# Patient Record
Sex: FEMALE | Race: WHITE | NOT HISPANIC OR LATINO
[De-identification: names, ages, dates, MRNs, and addresses within clinical notes are randomized per-mention and may not be internally consistent; named-entity substitution may affect disease eponyms.]

---

## 2016-10-29 RX ORDER — LEVOTHYROXINE SODIUM 125 MCG
1 TABLET ORAL
Qty: 0 | Refills: 0 | DISCHARGE
Start: 2016-10-29 | End: 2016-11-28

## 2016-10-29 RX ORDER — FERROUS SULFATE 325(65) MG
1 TABLET ORAL
Qty: 0 | Refills: 0 | DISCHARGE
Start: 2016-10-29 | End: 2016-11-08

## 2017-09-20 PROBLEM — Z51.89 VISIT FOR WOUND CHECK: Status: ACTIVE | Noted: 2017-09-20

## 2017-09-22 PROBLEM — I50.32 CHRONIC DIASTOLIC CONGESTIVE HEART FAILURE: Status: ACTIVE | Noted: 2017-09-22

## 2017-09-25 ENCOUNTER — APPOINTMENT (OUTPATIENT)
Dept: CARDIOTHORACIC SURGERY | Facility: CLINIC | Age: 77
End: 2017-09-25
Payer: MEDICARE

## 2017-09-25 VITALS
HEART RATE: 76 BPM | TEMPERATURE: 97.9 F | OXYGEN SATURATION: 92 % | DIASTOLIC BLOOD PRESSURE: 61 MMHG | SYSTOLIC BLOOD PRESSURE: 124 MMHG

## 2017-09-25 DIAGNOSIS — Z51.89 ENCOUNTER FOR OTHER SPECIFIED AFTERCARE: ICD-10-CM

## 2017-09-25 DIAGNOSIS — Z09 ENCOUNTER FOR FOLLOW-UP EXAMINATION AFTER COMPLETED TREATMENT FOR CONDITIONS OTHER THAN MALIGNANT NEOPLASM: ICD-10-CM

## 2017-09-25 DIAGNOSIS — Z95.2 PRESENCE OF PROSTHETIC HEART VALVE: ICD-10-CM

## 2017-09-25 DIAGNOSIS — I50.32 CHRONIC DIASTOLIC (CONGESTIVE) HEART FAILURE: ICD-10-CM

## 2017-09-25 PROCEDURE — 99213 OFFICE O/P EST LOW 20 MIN: CPT | Mod: NC

## 2022-03-11 ENCOUNTER — NON-APPOINTMENT (OUTPATIENT)
Age: 82
End: 2022-03-11

## 2022-03-11 ENCOUNTER — APPOINTMENT (OUTPATIENT)
Dept: NEUROSURGERY | Facility: CLINIC | Age: 82
End: 2022-03-11
Payer: MEDICARE

## 2022-03-11 VITALS
DIASTOLIC BLOOD PRESSURE: 84 MMHG | HEIGHT: 63 IN | BODY MASS INDEX: 30.12 KG/M2 | WEIGHT: 170 LBS | RESPIRATION RATE: 95 BRPM | TEMPERATURE: 98.2 F | HEART RATE: 72 BPM | SYSTOLIC BLOOD PRESSURE: 152 MMHG

## 2022-03-11 DIAGNOSIS — Z01.812 ENCOUNTER FOR PREPROCEDURAL LABORATORY EXAMINATION: ICD-10-CM

## 2022-03-11 DIAGNOSIS — I65.23 OCCLUSION AND STENOSIS OF BILATERAL CAROTID ARTERIES: ICD-10-CM

## 2022-03-11 PROCEDURE — 99205 OFFICE O/P NEW HI 60 MIN: CPT

## 2022-03-15 NOTE — DATA REVIEWED
[de-identified] : CT 2/25/22: no infarct, hemorrhage or edema [de-identified] : Carotid dopplers 2/25/22: Mild to moderate bilateral atheromatous change

## 2022-03-15 NOTE — ASSESSMENT
[FreeTextEntry1] : I have discussed the natural history and treatment options for moderate carotid stenosis with the patient. I explained the indications for observation and imaging surveillance, medical management, and surgery. I discussed the risks, benefits, possible complications and expected outcome related to each treatment option.  In the end, I recommend an MRI brain to rule out silent infarct and CTA head and neck to further evaluate the severity of the carotid stenosis. The patient should continue a baby ASA daily and should continue daily statin therapy with aim for goal LDL <70 and HbA1c <6.0 for stroke prevention. The patient should return to the office for follow up in 2 weeks upon completion of the imaging for review and treatment planning.  The patient reports that she will be out of town from 3/12/22-4/2/22 and will not be able to get her imaging until after 4/2.  The patient understands the plan of care and is in agreement.  All questions answered to patient satisfaction.\par

## 2022-03-15 NOTE — PHYSICAL EXAM
[General Appearance - Alert] : alert [General Appearance - In No Acute Distress] : in no acute distress [Oriented To Time, Place, And Person] : oriented to person, place, and time [Affect] : the affect was normal [Impaired Insight] : insight and judgment were intact [Person] : oriented to person [Place] : oriented to place [Time] : oriented to time [Short Term Intact] : short term memory intact [Remote Intact] : remote memory intact [Span Intact] : the attention span was normal [Concentration Intact] : normal concentrating ability [Fluency] : fluency intact [Comprehension] : comprehension intact [Current Events] : adequate knowledge of current events [Past History] : adequate knowledge of personal past history [Vocabulary] : adequate range of vocabulary [Cranial Nerves Optic (II)] : visual acuity intact bilaterally,  pupils equal round and reactive to light [Cranial Nerves Oculomotor (III)] : extraocular motion intact [Cranial Nerves Trigeminal (V)] : facial sensation intact symmetrically [Cranial Nerves Facial (VII)] : face symmetrical [Cranial Nerves Vestibulocochlear (VIII)] : hearing was intact bilaterally [Cranial Nerves Glossopharyngeal (IX)] : tongue and palate midline [Cranial Nerves Accessory (XI - Cranial And Spinal)] : head turning and shoulder shrug symmetric [Cranial Nerves Hypoglossal (XII)] : there was no tongue deviation with protrusion [Motor Tone] : muscle tone was normal in all four extremities [Motor Strength] : muscle strength was normal in all four extremities [No Muscle Atrophy] : normal bulk in all four extremities [Sensation Tactile Decrease] : light touch was intact [Abnormal Walk] : normal gait [Balance] : balance was intact [2+] : Patella left 2+ [Past-pointing] : there was no past-pointing [Tremor] : no tremor present [FreeTextEntry5] : few beats of nystagmus on left lateral gaze

## 2022-03-15 NOTE — HISTORY OF PRESENT ILLNESS
[de-identified] : RONNIE CHAVARRIA is a 82 year female with a PMH of HTN, DM, HLD, anemia, Anxiety, transient Afib after heart surgery not currently on AC, Valvular disease, non-smoker, s/p AVR 2016, Hypothyroid s/p thyroid ca w/ resection, who presents to the office today for neuroendovascular consultation as a referral from Dr. Álvarez due to mild to moderate bilateral carotid stenosis on recent Carotid U/S done 2/25/22.  The patient reports dizziness that has been occurring on a daily basis since about 4 days after a fall she sustained in February in which she hit her head, no LOC.  CTH at the time was negative for intracranial pathology.  She states that her dizziness is characterized as a lightheaded feeling that starts shortly after she gets out of bed for the day and lasts most of the day.  She has not fallen.   She denies headache, weakness, numbness, speech or visual difficulty, gait imbalance.  She has never had a stroke.  She is currently on ASA and statin therapy.  She is here to rule out possible cerebrovascular etiology for her dizziness.\par No allergies\par Never a smoker. \par \par

## 2022-04-14 LAB
ANION GAP SERPL CALC-SCNC: 13 MMOL/L
BUN SERPL-MCNC: 13 MG/DL
CALCIUM SERPL-MCNC: 9.4 MG/DL
CHLORIDE SERPL-SCNC: 99 MMOL/L
CO2 SERPL-SCNC: 27 MMOL/L
CREAT SERPL-MCNC: 0.94 MG/DL
EGFR: 61 ML/MIN/1.73M2
GLUCOSE SERPL-MCNC: 90 MG/DL
POTASSIUM SERPL-SCNC: 4.4 MMOL/L
SODIUM SERPL-SCNC: 139 MMOL/L

## 2022-04-15 ENCOUNTER — RESULT REVIEW (OUTPATIENT)
Age: 82
End: 2022-04-15

## 2022-04-29 ENCOUNTER — APPOINTMENT (OUTPATIENT)
Dept: NEUROSURGERY | Facility: CLINIC | Age: 82
End: 2022-04-29
Payer: MEDICARE

## 2022-04-29 PROCEDURE — 99443: CPT

## 2022-04-30 NOTE — PHYSICAL EXAM
[General Appearance - Alert] : alert [] : normal voice and communication [Person] : oriented to person [Place] : oriented to place [Time] : oriented to time [Short Term Intact] : short term memory intact [Remote Intact] : remote memory intact [Registration Intact] : recent registration memory intact [Fluency] : fluency intact [Current Events] : adequate knowledge of current events

## 2022-05-04 NOTE — DATA REVIEWED
[de-identified] : \par  [facility] CT Report             Final\par \par No Documents Attached\par \par \par \par \par   [facility]\par                                          [address]\par                                    [place], [place]  [address]\par                                        Department of Radiology\par                                             [phone]\par \par \par Patient Name:      RONNIE CHAVARRIA                Location:       [facility]\par Med Rec #:        [id number]                    Account #:      [id number]\par YOB: 1940                    Ordering:       Susan Emerson\par Age: 82               Sex:    F                 Attending:      Susan Emerson\par PCP:        Vikas Álvarez MD\par ______________________________________________________________________________________\par \par Exam Date:      04/15/22\par Exam:         CTA HEAD (W)AW IC; CTA NECK (W)AW IC\par \par Order#:       [id number]; [id number]\par \par \par \par PROCEDURE: CTA head with and without intravenous contrast. CTA neck with and without intravenous\par contrast.\par  INDICATION: Nonspecific neurological symptoms. Bilateral carotid artery stenosis\par \par  TECHNIQUE: CT angiography of the head and neck was performed with and without administration of\par intravenous contrast.\par \par  Multiple thin section axial images were obtained of the head through the [place] prior to\par and following the intravenous injection of contrast. Multiple 3-D reformatted images were generated\par from the axial cuts. Postprocessing was performed on an independent workstation.\par \par  Multiple axial thin sections were obtained through the neck prior to and following the intravenous\par injection of contrast. Multiple 3-D reformatted images were generated from the axial cuts.\par Postprocessing was performed on an independent workstation.\par \par \par  100 mL of of Isovue 370 were administered intravenously.\par \par  COMPARISON: None available\par \par  FINDINGS:\par \par  CTA Head:\par \par  Please see concurrent separately dictated noncontrast MRI examination for discussion of\par intracranial findings.\par \par  Atherosclerotic calcifications of the supraclinoid and cavernous segments of bilateral internal\par carotid arteries and V4 segments of bilateral vertebral arteries contributing to mild stenosis.\par \par  Intracranial segments of bilateral internal carotid arteries, bilateral proximal middle cerebral\par arteries, proximal bilateral anterior cerebral arteries and ophthalmic arteries are patent without\par hemodynamically significant stenosis or dissection.\par \par  Superior and inferior divisions and proximal M2 segments the bilateral middle cerebral arteries are\par patent.\par \par  Anterior communicating artery is unremarkable. Bilateral posterior communicating arteries are\par present.\par \par  Basilar artery, proximal bilateral posterior cerebral arteries, proximal bilateral superior\par cerebellar arteries, proximal bilateral posterior inferior cerebellar arteries, V4 segments of the\par bilateral vertebral arteries, and bilateral proximal anterior cerebellar arteries are patent without\par hemodynamically significant stenosis or dissection.\par \par  No saccular intracranial aneurysm identified. No evidence for cerebral arteriovenous malformation.\par Dural venous sinuses appear to be patent.\par \par \par  CTA Neck:\par \par  Normal 3 vessel branch pattern present at aortic arch.\par \par  Scattered atherosclerotic calcifications of the thoracic aorta and proximal great vessels.\par \par  Origins of great vessels are patent. Origins of vertebral arteries are patent.\par \par  There is calcified/noncalcified atherosclerotic plaque of the bilateral carotid bifurcations and\par bilateral proximal internal carotid arteries, contributing to less than 50% stenosis as per NASCET\par criteria.\par \par  Bilateral common carotid, bilateral common carotid, cervical segments of bilateral internal\par carotid, V1 through V3 segments of bilateral vertebral and bilateral carotid bifurcations are patent\par without hemodynamically significant stenosis or dissection.\par  There is dental amalgam with streak artifact, which obscures evaluation of the oral cavity within\par the aerodigestive tract.\par \par  No enlarged cervical lymph nodes by CT size criteria are identified. No cervical lymph nodes with\par areas of internal necrosis or calcification are identified.\par \par  Parotid, submandibular and thyroid glands are within normal limits. Visualized lung apices are\par unremarkable.\par \par  There are multilevel degenerative changes of cervical spine with posterior osteophytic ridging with\par disc bulging and bilateral uncovertebral/facet hypertrophy most prominently at the C4-C5 and C5-C6\par level where there is mild to moderate canal stenosis and mild to moderate bilateral foraminal\par stenosis.\par \par  Partially imaged median sternotomy.\par \par \par \par  IMPRESSION:\par \par  Calcified/noncalcified atherosclerotic plaque of the bilateral carotid bifurcations and bilateral\par proximal internal carotid arteries, contributing to less than 50% stenosis as per NASCET criteria.\par \par  No hemodynamically significant stenosis or dissection identified in proximal intracranial and neck\par vasculature.\par  No saccular intracranial aneurysm identified.\par  Please refer to noncontrast head MRI examination for discussion of additional findings.\par \par  If there are questions/need for clarification regarding this report, Dr. Jasvir Ashley can be best\par reached via the patient [employer] [facility] [employer] system at [email or web address].\par \par  --- End of Report ---\par \par ***Electronically Signed ***\par -----------------------------------------------\par Jasvir Hunter MD              04/15/22 1712\par \par Dictated on 04/15/22\par \par \par Report cc:  Susan Emerson;\par \par  \par \par  Ordered by: SUSAN EMERSON       Collected/Examined: 15Apr2022 02:38PM       \par Verification Required       Stage: Final       \par  Performed at: [facility]       Resulted: 15Apr2022 05:12PM       Last Updated: 15Apr2022 05:16PM       Accession: [id number]        [de-identified] : \par  [facility] CT Report             Final\par \par No Documents Attached\par \par \par \par \par   [facility]\par                                          [address]\par                                    [place], [place]  [address]\par                                        Department of Radiology\par                                             [phone]\par \par \par Patient Name:      RONNIE CHAVARRIA                Location:       [facility]\par Med Rec #:        [id number]                    Account #:      [id number]\par YOB: 1940                    Ordering:       Susan Emerson\par Age: 82               Sex:    F                 Attending:      Susan Emerson\par PCP:        Vikas Álvarez MD\par ______________________________________________________________________________________\par \par Exam Date:      04/15/22\par Exam:         CTA HEAD (W)AW IC; CTA NECK (W)AW IC\par \par Order#:       CT [id number]; [id number]\par \par \par \par PROCEDURE: CTA head with and without intravenous contrast. CTA neck with and without intravenous\par contrast.\par  INDICATION: Nonspecific neurological symptoms. Bilateral carotid artery stenosis\par \par  TECHNIQUE: CT angiography of the head and neck was performed with and without administration of\par intravenous contrast.\par \par  Multiple thin section axial images were obtained of the head through the [place] prior to\par and following the intravenous injection of contrast. Multiple 3-D reformatted images were generated\par from the axial cuts. Postprocessing was performed on an independent workstation.\par \par  Multiple axial thin sections were obtained through the neck prior to and following the intravenous\par injection of contrast. Multiple 3-D reformatted images were generated from the axial cuts.\par Postprocessing was performed on an independent workstation.\par \par \par  100 mL of of Isovue 370 were administered intravenously.\par \par  COMPARISON: None available\par \par  FINDINGS:\par \par  CTA Head:\par \par  Please see concurrent separately dictated noncontrast MRI examination for discussion of\par intracranial findings.\par \par  Atherosclerotic calcifications of the supraclinoid and cavernous segments of bilateral internal\par carotid arteries and V4 segments of bilateral vertebral arteries contributing to mild stenosis.\par \par  Intracranial segments of bilateral internal carotid arteries, bilateral proximal middle cerebral\par arteries, proximal bilateral anterior cerebral arteries and ophthalmic arteries are patent without\par hemodynamically significant stenosis or dissection.\par \par  Superior and inferior divisions and proximal M2 segments the bilateral middle cerebral arteries are\par patent.\par \par  Anterior communicating artery is unremarkable. Bilateral posterior communicating arteries are\par present.\par \par  Basilar artery, proximal bilateral posterior cerebral arteries, proximal bilateral superior\par cerebellar arteries, proximal bilateral posterior inferior cerebellar arteries, V4 segments of the\par bilateral vertebral arteries, and bilateral proximal anterior cerebellar arteries are patent without\par hemodynamically significant stenosis or dissection.\par \par  No saccular intracranial aneurysm identified. No evidence for cerebral arteriovenous malformation.\par Dural venous sinuses appear to be patent.\par \par \par  CTA Neck:\par \par  Normal 3 vessel branch pattern present at aortic arch.\par \par  Scattered atherosclerotic calcifications of the thoracic aorta and proximal great vessels.\par \par  Origins of great vessels are patent. Origins of vertebral arteries are patent.\par \par  There is calcified/noncalcified atherosclerotic plaque of the bilateral carotid bifurcations and\par bilateral proximal internal carotid arteries, contributing to less than 50% stenosis as per NASCET\par criteria.\par \par  Bilateral common carotid, bilateral common carotid, cervical segments of bilateral internal\par carotid, V1 through V3 segments of bilateral vertebral and bilateral carotid bifurcations are patent\par without hemodynamically significant stenosis or dissection.\par  There is dental amalgam with streak artifact, which obscures evaluation of the oral cavity within\par the aerodigestive tract.\par \par  No enlarged cervical lymph nodes by CT size criteria are identified. No cervical lymph nodes with\par areas of internal necrosis or calcification are identified.\par \par  Parotid, submandibular and thyroid glands are within normal limits. Visualized lung apices are\par unremarkable.\par \par  There are multilevel degenerative changes of cervical spine with posterior osteophytic ridging with\par disc bulging and bilateral uncovertebral/facet hypertrophy most prominently at the C4-C5 and C5-C6\par level where there is mild to moderate canal stenosis and mild to moderate bilateral foraminal\par stenosis.\par \par  Partially imaged median sternotomy.\par \par \par \par  IMPRESSION:\par \par  Calcified/noncalcified atherosclerotic plaque of the bilateral carotid bifurcations and bilateral\par proximal internal carotid arteries, contributing to less than 50% stenosis as per NASCET criteria.\par \par  No hemodynamically significant stenosis or dissection identified in proximal intracranial and neck\par vasculature.\par  No saccular intracranial aneurysm identified.\par  Please refer to noncontrast head MRI examination for discussion of additional findings.\par \par  If there are questions/need for clarification regarding this report, Dr. Jasvir Ashley can be best\par reached via the patient [employer] [email or web address] system at [email or web address].\par \par  --- End of Report ---\par \par ***Electronically Signed ***\par -----------------------------------------------\par Jasvir Hunter MD              04/15/22 1712\par \par Dictated on 04/15/22\par \par \par Report cc:  Susan Emerson;\par \par  \par \par  Ordered by: SUSAN EMERSON       Collected/Examined: 15Apr2022 02:38PM       \par Verification Required       Stage: Final       \par  Performed at: [facility]       Resulted: 15Apr2022 05:12PM       Last Updated: 15Apr2022 05:16PM       Accession: [id number]

## 2022-05-04 NOTE — REASON FOR VISIT
[FreeTextEntry1] : The patient has given verbal consent for this telehealth visit using two-way audio technology.  The patient is currently [address] [facility] [place] [address], and I am located at my office at [facility].\par \par Ms. Chavarria returns today for interval follow up for her moderate carotid stenosis seen on recent outpatient Carotid doppler done on 2/25/22.  She has undergone a CTA of the head and neck as requested, which shows <50% stenosis bilaterally. MRI brain is normal.  She has no new complaints today. \par \par 3/11/22: RONNIE CHAVARRIA is a 82 year female with a PMH of HTN, DM, HLD, anemia, Anxiety, transient Afib after heart surgery not currently on AC, Valvular disease, non-smoker, s/p AVR 2016, Hypothyroid s/p thyroid ca w/ resection, who presents to the office today for neuroendovascular consultation as a referral from Dr. Álvarez due to mild to moderate bilateral carotid stenosis on recent Carotid U/S done 2/25/22. The patient reports dizziness that has been occurring on a daily basis since about 4 days after a fall she sustained in February in which she hit her head, no LOC. CTH at the time was negative for intracranial pathology. She states that her dizziness is characterized as a lightheaded feeling that starts shortly after she gets out of bed for the day and lasts most of the day. She has not fallen. She denies headache, weakness, numbness, speech or visual difficulty, gait imbalance. She has never had a stroke. She is currently on ASA and statin therapy. She is here to rule out possible cerebrovascular etiology for her dizziness.\par No allergies\par Never a smoker.

## 2022-05-04 NOTE — ASSESSMENT
[FreeTextEntry1] : I have discussed the natural history and treatment options for carotid stenosis with the patient. I explained the indications for observation and imaging surveillance, medical management, and surgery. I discussed the risks, benefits, possible complications and expected outcome related to each treatment option.  In the end, I recommend that we follow her with annual  office visit with carotid dopplers prior and follow up with her PCP Dr. Álvarez as well.  The patient should continue a baby ASA daily and should continue daily statin therapy with aim for goal LDL <70 and HbA1c <6.0 for stroke prevention. The patient understands the plan of care and is in agreement.  All questions answered to patient satisfaction.\par

## 2022-07-20 ENCOUNTER — NON-APPOINTMENT (OUTPATIENT)
Age: 82
End: 2022-07-20

## 2022-07-21 ENCOUNTER — APPOINTMENT (OUTPATIENT)
Dept: THORACIC SURGERY | Facility: CLINIC | Age: 82
End: 2022-07-21

## 2022-07-21 ENCOUNTER — APPOINTMENT (OUTPATIENT)
Dept: NUCLEAR MEDICINE | Facility: HOSPITAL | Age: 82
End: 2022-07-21

## 2022-07-21 ENCOUNTER — OUTPATIENT (OUTPATIENT)
Dept: OUTPATIENT SERVICES | Facility: HOSPITAL | Age: 82
LOS: 1 days | End: 2022-07-21
Payer: MEDICARE

## 2022-07-21 VITALS
BODY MASS INDEX: 30.65 KG/M2 | HEART RATE: 80 BPM | SYSTOLIC BLOOD PRESSURE: 158 MMHG | DIASTOLIC BLOOD PRESSURE: 70 MMHG | OXYGEN SATURATION: 93 % | WEIGHT: 173 LBS | RESPIRATION RATE: 18 BRPM | HEIGHT: 63 IN | TEMPERATURE: 97 F

## 2022-07-21 DIAGNOSIS — Z98.89 OTHER SPECIFIED POSTPROCEDURAL STATES: Chronic | ICD-10-CM

## 2022-07-21 DIAGNOSIS — J06.9 ACUTE UPPER RESPIRATORY INFECTION, UNSPECIFIED: ICD-10-CM

## 2022-07-21 DIAGNOSIS — Z90.710 ACQUIRED ABSENCE OF BOTH CERVIX AND UTERUS: Chronic | ICD-10-CM

## 2022-07-21 DIAGNOSIS — R91.8 OTHER NONSPECIFIC ABNORMAL FINDING OF LUNG FIELD: ICD-10-CM

## 2022-07-21 DIAGNOSIS — E89.0 POSTPROCEDURAL HYPOTHYROIDISM: Chronic | ICD-10-CM

## 2022-07-21 DIAGNOSIS — Z95.2 PRESENCE OF PROSTHETIC HEART VALVE: Chronic | ICD-10-CM

## 2022-07-21 LAB — GLUCOSE BLDC GLUCOMTR-MCNC: 123 MG/DL — HIGH (ref 70–99)

## 2022-07-21 PROCEDURE — 99202 OFFICE O/P NEW SF 15 MIN: CPT

## 2022-07-21 PROCEDURE — A9552: CPT

## 2022-07-21 PROCEDURE — 78815 PET IMAGE W/CT SKULL-THIGH: CPT

## 2022-07-21 PROCEDURE — 78815 PET IMAGE W/CT SKULL-THIGH: CPT | Mod: 26,PI

## 2022-07-21 PROCEDURE — 82962 GLUCOSE BLOOD TEST: CPT

## 2022-07-21 NOTE — PHYSICAL EXAM
[General Appearance - Alert] : alert [] : no respiratory distress [Respiration, Rhythm And Depth] : normal respiratory rhythm and effort [Exaggerated Use Of Accessory Muscles For Inspiration] : no accessory muscle use [Apical Impulse] : the apical impulse was normal [Heart Rate And Rhythm] : heart rate was normal and rhythm regular [Heart Sounds] : normal S1 and S2 [Examination Of The Chest] : the chest was normal in appearance [2+] : left 2+ [Abnormal Walk] : normal gait [Oriented To Time, Place, And Person] : oriented to person, place, and time

## 2022-07-26 PROBLEM — J06.9 URI, ACUTE: Status: RESOLVED | Noted: 2022-07-26 | Resolved: 2022-08-25

## 2022-07-26 NOTE — HISTORY OF PRESENT ILLNESS
[FreeTextEntry1] : 81 y/o female, nonsmoker with a PMH of thyroid cancer (s/p total thyroidectomy in 2009, HTN, DMII, HLD, anxiety, aortic valve replacement (2016). Patient underwent a CT chest revealing multiple bilateral nodules and is referred by Dr. Vikas Álvarez for further evaluation. \par \par CT chest completed on 07/12/22:\par -multiple nodules bilaterally. \par -there is no significant mediastinal lymphadenopathy \par

## 2022-07-26 NOTE — ASSESSMENT
[FreeTextEntry1] : 81 y/o female, nonsmoker with a PMH of thyroid cancer (s/p total thyroidectomy in 2009, HTN, DMII, HLD, anxiety, aortic valve replacement (2016). Patient underwent a CT chest revealing multiple bilateral nodules and is referred by Dr. Vikas Álvarez for further evaluation. \par \par Patient admits to feeling well. She denies cough, shortness of breath, unintentional weight loss, or headaches.\par She does admit to having episodes of pneumonia in the past and has had multiple CXR to further evaluate the scar tissue. \par \par CT chest was reviewed and there is evidence of multiple bilateral small nodules. I explained that this may be related to her prior thyroid cancer, however further testing is indicated. Will plan for a PET CT and depending on the results will determine if a biopsy is indicated. Biopsy may be CT guided vs. surgical depending on the location. \par \par Plan:\par 1. PET CT\par 2. TEB afterwards \par \par EMMIE, ABIGAIL KHANNA , am scribing for and in the presence of TRACEY GREGG the following sections: history of present illness, past medical/family/surgical/family/social history, review of systems, vital signs, physical exam, and disposition.\par \par TRACEY BEATTY, personally performed the service described in the documentation, reviewed the documentation recorded by the scribe in my presence and it accurately and completely records my words and actions. \par \par Tracey OLEA MD personally performed the services described in the documentation, reviewed the documentation recorded by the scribe in my presence and it accurately and completely records my words and actions. I have personally seen, examined, and participated in the care of this patient.  I have reviewed all pertinent clinical information, including history and physical exam and plan.  I agree with the above history, physical and plan of the ACP which I have reviewed and edited where appropriate.\par \par \par

## 2022-07-26 NOTE — CONSULT LETTER
[Dear  ___] : Dear  [unfilled], [Consult Letter:] : I had the pleasure of evaluating your patient, [unfilled]. [Please see my note below.] : Please see my note below. [Consult Closing:] : Thank you very much for allowing me to participate in the care of this patient.  If you have any questions, please do not hesitate to contact me. [Sincerely,] : Sincerely, [FreeTextEntry3] : Dr. Tracey Heath

## 2022-07-28 ENCOUNTER — APPOINTMENT (OUTPATIENT)
Dept: THORACIC SURGERY | Facility: CLINIC | Age: 82
End: 2022-07-28

## 2022-07-28 DIAGNOSIS — R91.1 SOLITARY PULMONARY NODULE: ICD-10-CM

## 2022-07-28 DIAGNOSIS — C73 MALIGNANT NEOPLASM OF THYROID GLAND: ICD-10-CM

## 2022-07-28 PROCEDURE — 99202 OFFICE O/P NEW SF 15 MIN: CPT | Mod: 95

## 2022-07-28 PROCEDURE — 99212 OFFICE O/P EST SF 10 MIN: CPT | Mod: 95

## 2022-07-28 NOTE — REASON FOR VISIT
[Home] : at home, [unfilled] , at the time of the visit. [Medical Office: ([facility])___] : at the medical office located in

## 2022-07-29 NOTE — ASSESSMENT
[FreeTextEntry1] : 83 y/o female, nonsmoker with a PMH of thyroid cancer (s/p total thyroidectomy in 2009, HTN, DMII, HLD, anxiety, aortic valve replacement (2016). Patient presents today to review her PET CT. She is referred by Dr. Vikas Álvarez for further evaluation. \par \par PET CT completed on 07/20/22:\par 1. Right middle lobe 5mm pulmonary nodule which demonstrate mild FDG uptake, may represent infectious/inflammatory changes versus malignancy.\par 2. Tree-in-bud nodularity within the right middle lobe and left lower lobe suggestive of infectious/inflammatory changes..\par 3. No additional sites of pathologic FDG uptake to suggest biologic tumor activity.\par \par Above PET scan reviewed with patient. Discussed that the right middle lobe nodule may represent infectious/inflammatory changes versus malignancy. Will prescribe Levaquin x 7 days. Will plan for repeat CT chest in 3 months for re-evaluation. All questions and concerns answered. \par \par PLAN:\par 1. Rx Levaquin\par 2. CT chest 3 months \par \par

## 2022-07-29 NOTE — END OF VISIT
[FreeTextEntry3] : I, ANTON BARFIELD , am scribing for and in the presence of TRACEY GREGG the following sections: history of present illness, past medical/family/surgical/family/social history, review of systems, vital signs, physical exam, and disposition.\par \par Tracey OLEA MD personally performed the services described in the documentation, reviewed the documentation recorded by the scribe in my presence and it accurately and completely records my words and actions. I have personally seen, examined, and participated in the care of this patient.  I have reviewed all pertinent clinical information, including history and physical exam and plan.  I agree with the above history, physical and plan of the ACP which I have reviewed and edited where appropriate.\par \par \par \par

## 2022-07-29 NOTE — HISTORY OF PRESENT ILLNESS
[FreeTextEntry1] : 83 y/o female, nonsmoker with a PMH of thyroid cancer (s/p total thyroidectomy in 2009, HTN, DMII, HLD, anxiety, aortic valve replacement (2016). Patient presents today to review her PET CT. She is referred by Dr. Vikas Álvarez for further evaluation. \par \par CT chest completed on 07/12/22:\par -multiple nodules bilaterally. \par -there is no significant mediastinal lymphadenopathy \par \par PET CT completed on 07/20/22:\par 1. Right middle lobe 5mm pulmonary nodule which demonstrate mild FDG uptake, may represent infectious/inflammatory changes versus malignancy.\par 2. Tree-in-bud nodularity within the right middle lobe and left lower lobe suggestive of infectious/inflammatory changes..\par 3. No additional sites of pathologic FDG uptake to suggest biologic tumor activity.\par \par

## 2022-10-25 ENCOUNTER — NON-APPOINTMENT (OUTPATIENT)
Age: 82
End: 2022-10-25

## 2022-10-25 ENCOUNTER — APPOINTMENT (OUTPATIENT)
Dept: HEART AND VASCULAR | Facility: CLINIC | Age: 82
End: 2022-10-25

## 2022-10-25 VITALS
HEART RATE: 73 BPM | DIASTOLIC BLOOD PRESSURE: 84 MMHG | BODY MASS INDEX: 31.18 KG/M2 | OXYGEN SATURATION: 95 % | SYSTOLIC BLOOD PRESSURE: 128 MMHG | WEIGHT: 176 LBS | TEMPERATURE: 97.8 F | HEIGHT: 63 IN

## 2022-10-25 PROCEDURE — 93000 ELECTROCARDIOGRAM COMPLETE: CPT

## 2022-10-25 PROCEDURE — 99204 OFFICE O/P NEW MOD 45 MIN: CPT | Mod: 25

## 2022-10-25 RX ORDER — LEVOFLOXACIN 500 MG/1
500 TABLET, FILM COATED ORAL DAILY
Qty: 7 | Refills: 0 | Status: DISCONTINUED | COMMUNITY
Start: 2022-07-26 | End: 2022-10-25

## 2022-10-28 NOTE — HISTORY OF PRESENT ILLNESS
[FreeTextEntry1] : 83 y/o F with PMHx of AVR (Bioprosthetic 2016), non-obstructive CAD, HTN, HLD, DM2\par \par Cath 9/29/2016: 30% mid LAD, 50% distal LAD, EF 60%, severe AS\par EKG 10/25/2022: NSR, HR 71, IVCD\par \par Here to establish care and for further management\par \par Patient reporting worsening SOB w/ exertion\par Denies chest pain

## 2022-10-28 NOTE — REVIEW OF SYSTEMS
[Dyspnea on exertion] : dyspnea during exertion [Chest Discomfort] : no chest discomfort [Lower Ext Edema] : no extremity edema [Negative] : Heme/Lymph

## 2022-10-28 NOTE — DISCUSSION/SUMMARY
[FreeTextEntry1] : 81 y/o F with PMHx of AVR (Bioprosthetic 2016), non-obstructive CAD, HTN, HLD, DM2\par \par # AVR\par Will order echo to further evaluate Aortic valve function and for SHD\par \par # SOB/ exertion\par # CAD\par Prior non-obstructive Cath 2016, 50% distal LAD\par CCTA to further evaluate CAD\par \par # HLD\par Continue Statin [EKG obtained to assist in diagnosis and management of assessed problem(s)] : EKG obtained to assist in diagnosis and management of assessed problem(s)

## 2022-11-04 ENCOUNTER — OUTPATIENT (OUTPATIENT)
Dept: OUTPATIENT SERVICES | Facility: HOSPITAL | Age: 82
LOS: 1 days | End: 2022-11-04
Payer: MEDICARE

## 2022-11-04 ENCOUNTER — APPOINTMENT (OUTPATIENT)
Dept: CT IMAGING | Facility: HOSPITAL | Age: 82
End: 2022-11-04

## 2022-11-04 DIAGNOSIS — Z90.710 ACQUIRED ABSENCE OF BOTH CERVIX AND UTERUS: Chronic | ICD-10-CM

## 2022-11-04 DIAGNOSIS — Z98.89 OTHER SPECIFIED POSTPROCEDURAL STATES: Chronic | ICD-10-CM

## 2022-11-04 DIAGNOSIS — Z95.2 PRESENCE OF PROSTHETIC HEART VALVE: Chronic | ICD-10-CM

## 2022-11-04 DIAGNOSIS — E89.0 POSTPROCEDURAL HYPOTHYROIDISM: Chronic | ICD-10-CM

## 2022-11-04 PROCEDURE — 71250 CT THORAX DX C-: CPT | Mod: 26

## 2022-11-04 PROCEDURE — 71250 CT THORAX DX C-: CPT

## 2022-11-10 ENCOUNTER — APPOINTMENT (OUTPATIENT)
Dept: THORACIC SURGERY | Facility: CLINIC | Age: 82
End: 2022-11-10

## 2022-11-10 ENCOUNTER — NON-APPOINTMENT (OUTPATIENT)
Age: 82
End: 2022-11-10

## 2022-11-10 VITALS
OXYGEN SATURATION: 95 % | DIASTOLIC BLOOD PRESSURE: 72 MMHG | WEIGHT: 176 LBS | HEART RATE: 74 BPM | HEIGHT: 63 IN | BODY MASS INDEX: 31.18 KG/M2 | SYSTOLIC BLOOD PRESSURE: 153 MMHG | TEMPERATURE: 97.3 F | RESPIRATION RATE: 18 BRPM

## 2022-11-10 DIAGNOSIS — R91.8 OTHER NONSPECIFIC ABNORMAL FINDING OF LUNG FIELD: ICD-10-CM

## 2022-11-10 PROCEDURE — 99213 OFFICE O/P EST LOW 20 MIN: CPT

## 2022-11-10 RX ORDER — LEVOTHYROXINE SODIUM 0.09 MG/1
88 TABLET ORAL
Refills: 0 | Status: ACTIVE | COMMUNITY
Start: 2022-11-10

## 2022-11-10 RX ORDER — LEVOTHYROXINE SODIUM 0.07 MG/1
75 TABLET ORAL
Refills: 0 | Status: ACTIVE | COMMUNITY
Start: 2022-11-10

## 2022-11-10 NOTE — HISTORY OF PRESENT ILLNESS
[FreeTextEntry1] : 81 y/o female, nonsmoker with a PMH of thyroid cancer (s/p total thyroidectomy in 2009, HTN, DMII, HLD, anxiety, aortic valve replacement (2016). She was referred by Dr. Vikas Álvarez for further evaluation. Prior PET scan In July 2022 showed a right middle lobe nodule (infectious/inflammatory changes versus malignancy). Completed Levaquin course x 7 days. \par \par Patient presents today to review recent chest CT and discuss results.  \par CT chest without contrast 10/24/22:\par 1. Poststernotomy and aortic valve replacement with heavy coronary artery calcification versus stents.\par 2. Findings compatible with small large airways inflammation characterized by bronchial wall thickening, mild cylindrical bronchiectasis and tree-in-bud micronodules.\par 3. Additional more discrete solid nodules some of which have a oval tubular appearance which may represent areas of mucous plugging unchanged from examination dated 7/12/2022 allowing for differences in imaging technique. The largest cyst located within the right middle lobe essentially unchanged measuring 6 mm. Abbreviated differential includes pulmonary nontuberculous mycobacterial infection.\par 4. Bronchoscopic correlation and continued short interval surveillance after therapeutic administration is suggested minimally to include 3 months follow-up in view the provided history of extrathoracic malignancy.\par

## 2022-11-10 NOTE — PHYSICAL EXAM
[Sclera] : the sclera and conjunctiva were normal [Neck Appearance] : the appearance of the neck was normal [] : no respiratory distress [Respiration, Rhythm And Depth] : normal respiratory rhythm and effort [Heart Rate And Rhythm] : heart rate was normal and rhythm regular [Heart Sounds] : normal S1 and S2 [Examination Of The Chest] : the chest was normal in appearance [2+] : left 2+ [Bowel Sounds] : normal bowel sounds [Abdomen Soft] : soft [No CVA Tenderness] : no ~M costovertebral angle tenderness [Abnormal Walk] : normal gait [Skin Color & Pigmentation] : normal skin color and pigmentation [No Focal Deficits] : no focal deficits [Oriented To Time, Place, And Person] : oriented to person, place, and time [FreeTextEntry1] : deferred

## 2022-11-10 NOTE — ASSESSMENT
[FreeTextEntry1] : 83 y/o female, nonsmoker with a PMH of thyroid cancer (s/p total thyroidectomy in 2009, HTN, DMII, HLD, anxiety, aortic valve replacement (2016). She was referred by Dr. Vikas Álvarez for further evaluation. Prior PET scan In July 2022 showed a right middle lobe nodule (infectious/inflammatory changes versus malignancy). Completed Levaquin course x 7 days. \par \par CT chest without contrast 10/24/22 reviewed with patient. stable pulmonary nodules and a  pulmonary cyst located within the right middle lobe essentially unchanged measuring 6 mm. \par \par The etiology of the nodules is unclear and they could represent infectious/inflammatory causes or possibly a malignancy. Given their small size and stability, we can follow with a chest CT in 6 months to assess their stability.\par \par Plan: \par Followup in 6 months with repeat CT chest without contrast. \par \par \par \par \par

## 2022-11-10 NOTE — DATA REVIEWED
[FreeTextEntry1] : PET CT completed on 07/20/22:\par 1. Right middle lobe 5mm pulmonary nodule which demonstrate mild FDG uptake, may represent infectious/inflammatory changes versus malignancy.\par 2. Tree-in-bud nodularity within the right middle lobe and left lower lobe suggestive of infectious/inflammatory changes..\par 3. No additional sites of pathologic FDG uptake to suggest biologic tumor activity.\par \par CT chest completed on 07/12/22:\par -multiple nodules bilaterally. \par -there is no significant mediastinal lymphadenopathy \par

## 2022-11-10 NOTE — END OF VISIT
[Time Spent: ___ minutes] : I have spent [unfilled] minutes of time on the encounter. [FreeTextEntry3] : \par I, ETHEL HALEY , am scribing for and in the presence of TRACEY GREGG the following sections: History of present illness, past Medical/family/surgical/family/social history, review of systems, vital signs, physical exam and disposition.\par \par \par \par Tracey OLEA MD personally performed the services described in the documentation, reviewed the documentation recorded by the scribe in my presence and it accurately and completely records my words and actions. I have personally seen, examined, and participated in the care of this patient.  I have reviewed all pertinent clinical information, including history and physical exam and plan.  I agree with the above history, physical and plan of the ACP which I have reviewed and edited where appropriate.

## 2022-11-19 LAB
ALBUMIN SERPL ELPH-MCNC: 4.3 G/DL
ALP BLD-CCNC: 56 U/L
ALT SERPL-CCNC: 15 U/L
ANION GAP SERPL CALC-SCNC: 11 MMOL/L
AST SERPL-CCNC: 15 U/L
BILIRUB SERPL-MCNC: 0.2 MG/DL
BUN SERPL-MCNC: 13 MG/DL
CALCIUM SERPL-MCNC: 9.4 MG/DL
CHLORIDE SERPL-SCNC: 99 MMOL/L
CO2 SERPL-SCNC: 26 MMOL/L
CREAT SERPL-MCNC: 0.84 MG/DL
EGFR: 70 ML/MIN/1.73M2
GLUCOSE SERPL-MCNC: 99 MG/DL
POTASSIUM SERPL-SCNC: 4.6 MMOL/L
PROT SERPL-MCNC: 6.3 G/DL
SODIUM SERPL-SCNC: 137 MMOL/L

## 2022-12-06 ENCOUNTER — RESULT REVIEW (OUTPATIENT)
Age: 82
End: 2022-12-06

## 2022-12-12 ENCOUNTER — APPOINTMENT (OUTPATIENT)
Dept: HEART AND VASCULAR | Facility: CLINIC | Age: 82
End: 2022-12-12

## 2022-12-12 ENCOUNTER — NON-APPOINTMENT (OUTPATIENT)
Age: 82
End: 2022-12-12

## 2022-12-12 VITALS — DIASTOLIC BLOOD PRESSURE: 80 MMHG | SYSTOLIC BLOOD PRESSURE: 140 MMHG | HEART RATE: 65 BPM

## 2022-12-12 PROCEDURE — 99214 OFFICE O/P EST MOD 30 MIN: CPT

## 2022-12-15 NOTE — HISTORY OF PRESENT ILLNESS
[FreeTextEntry1] : 83 y/o F with PMHx of AVR (Bioprosthetic 2016), CAD, HTN, HLD, DM2\par \par Cath 9/29/2016: 30% mid LAD, 50% distal LAD, EF 60%, severe AS\par \par CCTA 12/6/2022: Ca score 3018, dense calcium in LAD, RCA RPDA obscures lumen, unable to rule out significant stenosis\par CT FFR: RCA 0.79 and LAD 0.77 +ve, LCx 0.84\par \par TTE 11/18/2022: EF normal 57%, Bioprosthetic valve mean grd 8mmHg, mild MR, mild TR\par \par EKG 10/25/2022: NSR, HR 71, IVCD\par \par Patient reporting worsening SOB w/ exertion, here for follow up post CCTA\par Denies chest pain, palpitations, PND/Orthopnea

## 2022-12-15 NOTE — REVIEW OF SYSTEMS
[Dyspnea on exertion] : dyspnea during exertion [Chest Discomfort] : no chest discomfort [Lower Ext Edema] : no extremity edema [Leg Claudication] : no intermittent leg claudication [Palpitations] : no palpitations [Syncope] : no syncope [Negative] : Heme/Lymph

## 2022-12-15 NOTE — DISCUSSION/SUMMARY
[FreeTextEntry1] : 81 y/o F with PMHx of AVR (Bioprosthetic 2016), CAD, HTN, HLD, DM2\par \par # SOB/ exertion\par # CAD\par CCTA shows severe RCA and LAD disease with +ve FFR\par Will schedule for Cath [facility] given high amount of Calcification and possible need for atherectomy\par \par # AVR\par TTE 11/18/2022 performed at [facility] shows normal function aortic valve\par \par # HLD\par Continue Statin.

## 2023-01-06 ENCOUNTER — APPOINTMENT (OUTPATIENT)
Dept: CARDIOTHORACIC SURGERY | Facility: CLINIC | Age: 83
End: 2023-01-06

## 2023-01-10 VITALS
HEIGHT: 63 IN | RESPIRATION RATE: 17 BRPM | WEIGHT: 175.05 LBS | SYSTOLIC BLOOD PRESSURE: 137 MMHG | OXYGEN SATURATION: 96 % | TEMPERATURE: 98 F | HEART RATE: 70 BPM | DIASTOLIC BLOOD PRESSURE: 96 MMHG

## 2023-01-10 RX ORDER — CHLORHEXIDINE GLUCONATE 213 G/1000ML
1 SOLUTION TOPICAL ONCE
Refills: 0 | Status: DISCONTINUED | OUTPATIENT
Start: 2023-01-13 | End: 2023-01-14

## 2023-01-10 NOTE — H&P ADULT - NSICDXPASTMEDICALHX_GEN_ALL_CORE_FT
PAST MEDICAL HISTORY:  Aortic stenosis     DM (diabetes mellitus)     HLD (hyperlipidemia)     HTN (hypertension)     Thyroid cancer

## 2023-01-10 NOTE — H&P ADULT - ASSESSMENT
83 yo F with PMHx of HTN, HLD, DM-II, Thyroid CA, AS (s/p bioprosthetic AVR in 2016), chronic diastolic CHF who presents to the cardiac cath In light of pt's risk factors, CCS anginal equivalent symptoms, and abnormal CCTA result, with possible intervention if clinical indicated.       ASA III				Mallampati class: III	                  Sedation Plan:   [  ] None   [x  ] Moderate   [  ]  Deep    [  ]  General Anesthesia   Patient Is Suitable Candidate For Sedation?     [ x ] Yes   [  ] No   [  ] Not Applicable   Cath Order Entered: [ x ] Yes  DAPT LOAD Ordered: [  ] Yes  [ x ] No load 2/2 takes Aspirin 81mg daily, last dose yesterday 1/12/2023, H/H 13/40.2 and denies any bleeding. Give Aspirin 81mg PO x 1 and loaded Plavix 600mg PO x 1.   Pre-Cath fluids Ordered: [ x] Yes EF 57% on echo, h/o AS s/p bioprosthetic in 2016, euvolemic on exam, BUN/Cr 12/0.80 will give NS 50cc/hr x 2 hours for gentle hydration. _    Risks & benefits of procedure and sedation and risks and benefits for the alternative therapy have been explained to the patient and/or HCP in layman’s terms including but not limited to: allergic reaction, bleeding, infection, arrhythmia, respiratory compromise, renal and vascular compromise, limb damage, MI, CVA, emergent CABG/Vascular Surgery and death. Informed consent obtained and in chart.

## 2023-01-10 NOTE — H&P ADULT - NSHPLABSRESULTS_GEN_ALL_CORE
13.0   8.12  )-----------( 268      ( 13 Jan 2023 09:48 )             40.2       01-13    136  |  98  |  12  ----------------------------<  128<H>  4.4   |  25  |  0.80    Ca    9.1      13 Jan 2023 10:38    TPro  7.0  /  Alb  4.3  /  TBili  0.4  /  DBili  x   /  AST  17  /  ALT  16  /  AlkPhos  47  01-13      PT/INR - ( 13 Jan 2023 09:48 )   PT: 11.6 sec;   INR: 0.98          PTT - ( 13 Jan 2023 09:48 )  PTT:31.4 sec    CARDIAC MARKERS ( 13 Jan 2023 10:38 )  x     / x     / 89 U/L / x     / 3.3 ng/mL            EKG: nonischemic

## 2023-01-10 NOTE — H&P ADULT - NSICDXPASTSURGICALHX_GEN_ALL_CORE_FT
PAST SURGICAL HISTORY:  Aortic valve replaced     H/O thyroidectomy     H/O total hysterectomy     S/P breast lumpectomy benign breast lumpectomy

## 2023-01-10 NOTE — H&P ADULT - HISTORY OF PRESENT ILLNESS
COVID:   Pharmacy:  Cardiologist: Dr. Montero   Escort:    Verify meds    81 yo F with PMHx of HTN, HLD, DM-II, Thyroid CA, AS (s/p bioprosthetic AVR in 2016), chronic diastolic CHF who presented to cardiologist Dr. Montero with reports of CLARKE with ambulating __ for the past ___. Pt denies fever, chills, cough, CP, palpitations, LE edema, abdominal pain, N/V/D, dizziness, syncope. Pt underwent CCTA 12/6/22: calcium score 3018, dense calcium in LAD, RCA, RPDA obscures lumen, unable to r/o significant stenosis. CT FFR: RCA 0.79 (+), LAD 0.94 (-), LCx 0.84 (-), RPDA 0.71 (-). B/L tree in bud nodules compatible with small airway disease, bronchiolar wall thickening, random B/L pulmonary nodules measuring up to 6 mm. ECHO 11/18/22: EF 57%, bioprosthetic valve, peak AV gradient 13mmHg, mean gradient 8mmHg, mild MR/TR, trace [place]      Cardiac cath 9/29/2016: mLAD 30%, dLAD 50%, EF 60%, severe AS.    COVID: 1/11/23 @ local pharmacy- will bring  Pharmacy:   Cardiologist: Dr. Montero   Escort:    Verify meds    81 yo F with PMHx of HTN, HLD, DM-II, Thyroid CA, AS (s/p bioprosthetic AVR in 2016), chronic diastolic CHF who presented to cardiologist Dr. Montero with reports of CLARKE with ambulating __ for the past ___. Pt denies fever, chills, cough, CP, palpitations, LE edema, abdominal pain, N/V/D, dizziness, syncope. Pt underwent CCTA 12/6/22: calcium score 3018, dense calcium in LAD, RCA, RPDA obscures lumen, unable to r/o significant stenosis. CT FFR: RCA 0.79 (+), LAD 0.94 (-), LCx 0.84 (-), RPDA 0.71 (-). B/L tree in bud nodules compatible with small airway disease, bronchiolar wall thickening, random B/L pulmonary nodules measuring up to 6 mm. ECHO 11/18/22: EF 57%, bioprosthetic valve, peak AV gradient 13mmHg, mean gradient 8mmHg, mild MR/TR, trace [place]      Cardiac cath 9/29/2016: mLAD 30%, dLAD 50%, EF 60%, severe AS.    COVID: 1/11/23 @ local pharmacy- will bring  Pharmacy:   Cardiologist: Dr. Montero   Escort:    Verify meds  Pt requesting to see Dr. Lr team when she comes for the procedure    83 yo F with PMHx of HTN, HLD, DM-II, Thyroid CA, AS (s/p bioprosthetic AVR in 2016), chronic diastolic CHF who presented to cardiologist Dr. Montero with reports of CLARKE with ambulating __ for the past ___. Pt denies fever, chills, cough, CP, palpitations, LE edema, abdominal pain, N/V/D, dizziness, syncope. Pt underwent CCTA 12/6/22: calcium score 3018, dense calcium in LAD, RCA, RPDA obscures lumen, unable to r/o significant stenosis. CT FFR: RCA 0.79 (+), LAD 0.94 (-), LCx 0.84 (-), RPDA 0.71 (-). B/L tree in bud nodules compatible with small airway disease, bronchiolar wall thickening, random B/L pulmonary nodules measuring up to 6 mm. ECHO 11/18/22: EF 57%, bioprosthetic valve, peak AV gradient 13mmHg, mean gradient 8mmHg, mild MR/TR, trace [place]      Cardiac cath 9/29/2016: mLAD 30%, dLAD 50%, EF 60%, severe AS.    COVID: 1/11/23 @ local pharmacy- will bring  Pharmacy: [facility]   Cardiologist: Dr. Montero   Escort: family     81 yo F with PMHx of HTN, HLD, DM-II, Thyroid CA, AS (s/p bioprosthetic AVR in 2016), chronic diastolic CHF who presented to cardiologist Dr. Montero with reports of CLARKE with ambulating after 1/2 miles to a miles for the past 4 months. Pt denies fever, chills, cough, CP, palpitations, LE edema, abdominal pain, N/V/D, dizziness, syncope. Pt underwent CCTA 12/6/22: calcium score 3018, dense calcium in LAD, RCA, RPDA obscures lumen, unable to r/o significant stenosis. CT FFR: RCA 0.79 (+), LAD 0.94 (-), LCx 0.84 (-), RPDA 0.71 (+). B/L tree in bud nodules compatible with small airway disease, bronchiolar wall thickening, random B/L pulmonary nodules measuring up to 6 mm. ECHO 11/18/22: EF 57%, bioprosthetic valve, peak AV gradient 13mmHg, mean gradient 8mmHg, mild MR/TR, trace [place]. Cardiac cath 9/29/2016: mLAD 30%, dLAD 50%, EF 60%, severe AS.     In light of pt's risk factors, CCS anginal equivalent symptoms, and abnormal CCTA result, pt is now referred to the cardiac cath with possible intervention if clinical indicated.

## 2023-01-13 ENCOUNTER — INPATIENT (INPATIENT)
Facility: HOSPITAL | Age: 83
LOS: 0 days | Discharge: HOME | DRG: 247 | End: 2023-01-14
Attending: INTERNAL MEDICINE | Admitting: INTERNAL MEDICINE
Payer: MEDICARE

## 2023-01-13 DIAGNOSIS — E89.0 POSTPROCEDURAL HYPOTHYROIDISM: Chronic | ICD-10-CM

## 2023-01-13 DIAGNOSIS — Z98.89 OTHER SPECIFIED POSTPROCEDURAL STATES: Chronic | ICD-10-CM

## 2023-01-13 DIAGNOSIS — Z90.710 ACQUIRED ABSENCE OF BOTH CERVIX AND UTERUS: Chronic | ICD-10-CM

## 2023-01-13 DIAGNOSIS — Z95.2 PRESENCE OF PROSTHETIC HEART VALVE: Chronic | ICD-10-CM

## 2023-01-13 LAB
A1C WITH ESTIMATED AVERAGE GLUCOSE RESULT: 6.1 % — HIGH (ref 4–5.6)
ALBUMIN SERPL ELPH-MCNC: 4.3 G/DL — SIGNIFICANT CHANGE UP (ref 3.3–5)
ALP SERPL-CCNC: 47 U/L — SIGNIFICANT CHANGE UP (ref 40–120)
ALT FLD-CCNC: 16 U/L — SIGNIFICANT CHANGE UP (ref 10–45)
ANION GAP SERPL CALC-SCNC: 13 MMOL/L — SIGNIFICANT CHANGE UP (ref 5–17)
APTT BLD: 31.4 SEC — SIGNIFICANT CHANGE UP (ref 27.5–35.5)
AST SERPL-CCNC: 17 U/L — SIGNIFICANT CHANGE UP (ref 10–40)
BASOPHILS # BLD AUTO: 0.05 K/UL — SIGNIFICANT CHANGE UP (ref 0–0.2)
BASOPHILS NFR BLD AUTO: 0.6 % — SIGNIFICANT CHANGE UP (ref 0–2)
BILIRUB SERPL-MCNC: 0.4 MG/DL — SIGNIFICANT CHANGE UP (ref 0.2–1.2)
BUN SERPL-MCNC: 12 MG/DL — SIGNIFICANT CHANGE UP (ref 7–23)
CALCIUM SERPL-MCNC: 9.1 MG/DL — SIGNIFICANT CHANGE UP (ref 8.4–10.5)
CHLORIDE SERPL-SCNC: 98 MMOL/L — SIGNIFICANT CHANGE UP (ref 96–108)
CHOLEST SERPL-MCNC: 145 MG/DL — SIGNIFICANT CHANGE UP
CK MB CFR SERPL CALC: 3.3 NG/ML — SIGNIFICANT CHANGE UP (ref 0–6.7)
CK SERPL-CCNC: 89 U/L — SIGNIFICANT CHANGE UP (ref 25–170)
CO2 SERPL-SCNC: 25 MMOL/L — SIGNIFICANT CHANGE UP (ref 22–31)
CREAT SERPL-MCNC: 0.8 MG/DL — SIGNIFICANT CHANGE UP (ref 0.5–1.3)
EGFR: 74 ML/MIN/1.73M2 — SIGNIFICANT CHANGE UP
EOSINOPHIL # BLD AUTO: 0.05 K/UL — SIGNIFICANT CHANGE UP (ref 0–0.5)
EOSINOPHIL NFR BLD AUTO: 0.6 % — SIGNIFICANT CHANGE UP (ref 0–6)
ESTIMATED AVERAGE GLUCOSE: 128 MG/DL — HIGH (ref 68–114)
GLUCOSE BLDC GLUCOMTR-MCNC: 104 MG/DL — HIGH (ref 70–99)
GLUCOSE BLDC GLUCOMTR-MCNC: 160 MG/DL — HIGH (ref 70–99)
GLUCOSE SERPL-MCNC: 128 MG/DL — HIGH (ref 70–99)
HCT VFR BLD CALC: 40.2 % — SIGNIFICANT CHANGE UP (ref 34.5–45)
HDLC SERPL-MCNC: 53 MG/DL — SIGNIFICANT CHANGE UP
HGB BLD-MCNC: 13 G/DL — SIGNIFICANT CHANGE UP (ref 11.5–15.5)
IMM GRANULOCYTES NFR BLD AUTO: 0.2 % — SIGNIFICANT CHANGE UP (ref 0–0.9)
INR BLD: 0.98 — SIGNIFICANT CHANGE UP (ref 0.88–1.16)
ISTAT ACTK (ACTIVATED CLOTTING TIME KAOLIN): 317 SEC — HIGH (ref 74–137)
ISTAT ACTK (ACTIVATED CLOTTING TIME KAOLIN): 341 SEC — HIGH (ref 74–137)
LIPID PNL WITH DIRECT LDL SERPL: 61 MG/DL — SIGNIFICANT CHANGE UP
LYMPHOCYTES # BLD AUTO: 1.64 K/UL — SIGNIFICANT CHANGE UP (ref 1–3.3)
LYMPHOCYTES # BLD AUTO: 20.2 % — SIGNIFICANT CHANGE UP (ref 13–44)
MCHC RBC-ENTMCNC: 27.3 PG — SIGNIFICANT CHANGE UP (ref 27–34)
MCHC RBC-ENTMCNC: 32.3 GM/DL — SIGNIFICANT CHANGE UP (ref 32–36)
MCV RBC AUTO: 84.3 FL — SIGNIFICANT CHANGE UP (ref 80–100)
MONOCYTES # BLD AUTO: 0.49 K/UL — SIGNIFICANT CHANGE UP (ref 0–0.9)
MONOCYTES NFR BLD AUTO: 6 % — SIGNIFICANT CHANGE UP (ref 2–14)
NEUTROPHILS # BLD AUTO: 5.87 K/UL — SIGNIFICANT CHANGE UP (ref 1.8–7.4)
NEUTROPHILS NFR BLD AUTO: 72.4 % — SIGNIFICANT CHANGE UP (ref 43–77)
NON HDL CHOLESTEROL: 92 MG/DL — SIGNIFICANT CHANGE UP
NRBC # BLD: 0 /100 WBCS — SIGNIFICANT CHANGE UP (ref 0–0)
PLATELET # BLD AUTO: 268 K/UL — SIGNIFICANT CHANGE UP (ref 150–400)
POTASSIUM SERPL-MCNC: 4.4 MMOL/L — SIGNIFICANT CHANGE UP (ref 3.5–5.3)
POTASSIUM SERPL-SCNC: 4.4 MMOL/L — SIGNIFICANT CHANGE UP (ref 3.5–5.3)
PROT SERPL-MCNC: 7 G/DL — SIGNIFICANT CHANGE UP (ref 6–8.3)
PROTHROM AB SERPL-ACNC: 11.6 SEC — SIGNIFICANT CHANGE UP (ref 10.5–13.4)
RBC # BLD: 4.77 M/UL — SIGNIFICANT CHANGE UP (ref 3.8–5.2)
RBC # FLD: 14.6 % — HIGH (ref 10.3–14.5)
SODIUM SERPL-SCNC: 136 MMOL/L — SIGNIFICANT CHANGE UP (ref 135–145)
TRIGL SERPL-MCNC: 153 MG/DL — HIGH
WBC # BLD: 8.12 K/UL — SIGNIFICANT CHANGE UP (ref 3.8–10.5)
WBC # FLD AUTO: 8.12 K/UL — SIGNIFICANT CHANGE UP (ref 3.8–10.5)

## 2023-01-13 PROCEDURE — 92933 PRQ TRLML C ATHRC ST ANGIOP1: CPT | Mod: LD

## 2023-01-13 PROCEDURE — 93454 CORONARY ARTERY ANGIO S&I: CPT | Mod: 26,59

## 2023-01-13 PROCEDURE — 93010 ELECTROCARDIOGRAM REPORT: CPT

## 2023-01-13 PROCEDURE — 92978 ENDOLUMINL IVUS OCT C 1ST: CPT | Mod: 26,LD

## 2023-01-13 PROCEDURE — 99152 MOD SED SAME PHYS/QHP 5/>YRS: CPT

## 2023-01-13 RX ORDER — FOLIC ACID 0.8 MG
1 TABLET ORAL
Qty: 0 | Refills: 0 | DISCHARGE

## 2023-01-13 RX ORDER — LEVOTHYROXINE SODIUM 125 MCG
75 TABLET ORAL
Refills: 0 | Status: DISCONTINUED | OUTPATIENT
Start: 2023-01-13 | End: 2023-01-14

## 2023-01-13 RX ORDER — CLOPIDOGREL BISULFATE 75 MG/1
600 TABLET, FILM COATED ORAL ONCE
Refills: 0 | Status: COMPLETED | OUTPATIENT
Start: 2023-01-13 | End: 2023-01-13

## 2023-01-13 RX ORDER — FERROUS SULFATE 325(65) MG
325 TABLET ORAL DAILY
Refills: 0 | Status: DISCONTINUED | OUTPATIENT
Start: 2023-01-13 | End: 2023-01-14

## 2023-01-13 RX ORDER — SODIUM CHLORIDE 9 MG/ML
1000 INJECTION, SOLUTION INTRAVENOUS
Refills: 0 | Status: DISCONTINUED | OUTPATIENT
Start: 2023-01-13 | End: 2023-01-14

## 2023-01-13 RX ORDER — ASPIRIN/CALCIUM CARB/MAGNESIUM 324 MG
81 TABLET ORAL ONCE
Refills: 0 | Status: COMPLETED | OUTPATIENT
Start: 2023-01-13 | End: 2023-01-13

## 2023-01-13 RX ORDER — LEVOTHYROXINE SODIUM 125 MCG
88 TABLET ORAL
Refills: 0 | Status: DISCONTINUED | OUTPATIENT
Start: 2023-01-13 | End: 2023-01-14

## 2023-01-13 RX ORDER — SIMVASTATIN 20 MG/1
20 TABLET, FILM COATED ORAL AT BEDTIME
Refills: 0 | Status: DISCONTINUED | OUTPATIENT
Start: 2023-01-13 | End: 2023-01-14

## 2023-01-13 RX ORDER — FOLIC ACID 0.8 MG
1 TABLET ORAL DAILY
Refills: 0 | Status: DISCONTINUED | OUTPATIENT
Start: 2023-01-13 | End: 2023-01-14

## 2023-01-13 RX ORDER — SODIUM CHLORIDE 9 MG/ML
500 INJECTION INTRAMUSCULAR; INTRAVENOUS; SUBCUTANEOUS
Refills: 0 | Status: DISCONTINUED | OUTPATIENT
Start: 2023-01-13 | End: 2023-01-13

## 2023-01-13 RX ORDER — DEXTROSE 50 % IN WATER 50 %
12.5 SYRINGE (ML) INTRAVENOUS ONCE
Refills: 0 | Status: DISCONTINUED | OUTPATIENT
Start: 2023-01-13 | End: 2023-01-14

## 2023-01-13 RX ORDER — CLOPIDOGREL BISULFATE 75 MG/1
75 TABLET, FILM COATED ORAL DAILY
Refills: 0 | Status: DISCONTINUED | OUTPATIENT
Start: 2023-01-14 | End: 2023-01-14

## 2023-01-13 RX ORDER — INFLUENZA VIRUS VACCINE 15; 15; 15; 15 UG/.5ML; UG/.5ML; UG/.5ML; UG/.5ML
0.7 SUSPENSION INTRAMUSCULAR ONCE
Refills: 0 | Status: COMPLETED | OUTPATIENT
Start: 2023-01-13 | End: 2023-01-14

## 2023-01-13 RX ORDER — METOPROLOL TARTRATE 50 MG
50 TABLET ORAL EVERY 12 HOURS
Refills: 0 | Status: DISCONTINUED | OUTPATIENT
Start: 2023-01-13 | End: 2023-01-14

## 2023-01-13 RX ORDER — LEVOTHYROXINE SODIUM 125 MCG
1 TABLET ORAL
Qty: 0 | Refills: 0 | DISCHARGE

## 2023-01-13 RX ORDER — DEXTROSE 50 % IN WATER 50 %
25 SYRINGE (ML) INTRAVENOUS ONCE
Refills: 0 | Status: DISCONTINUED | OUTPATIENT
Start: 2023-01-13 | End: 2023-01-14

## 2023-01-13 RX ORDER — POTASSIUM CHLORIDE 20 MEQ
1 PACKET (EA) ORAL
Qty: 0 | Refills: 0 | DISCHARGE

## 2023-01-13 RX ORDER — INSULIN LISPRO 100/ML
VIAL (ML) SUBCUTANEOUS
Refills: 0 | Status: DISCONTINUED | OUTPATIENT
Start: 2023-01-13 | End: 2023-01-14

## 2023-01-13 RX ORDER — DEXTROSE 50 % IN WATER 50 %
15 SYRINGE (ML) INTRAVENOUS ONCE
Refills: 0 | Status: DISCONTINUED | OUTPATIENT
Start: 2023-01-13 | End: 2023-01-14

## 2023-01-13 RX ORDER — SODIUM CHLORIDE 9 MG/ML
500 INJECTION INTRAMUSCULAR; INTRAVENOUS; SUBCUTANEOUS
Refills: 0 | Status: DISCONTINUED | OUTPATIENT
Start: 2023-01-13 | End: 2023-01-14

## 2023-01-13 RX ORDER — GLUCAGON INJECTION, SOLUTION 0.5 MG/.1ML
1 INJECTION, SOLUTION SUBCUTANEOUS ONCE
Refills: 0 | Status: DISCONTINUED | OUTPATIENT
Start: 2023-01-13 | End: 2023-01-14

## 2023-01-13 RX ORDER — ASPIRIN/CALCIUM CARB/MAGNESIUM 324 MG
81 TABLET ORAL DAILY
Refills: 0 | Status: DISCONTINUED | OUTPATIENT
Start: 2023-01-13 | End: 2023-01-14

## 2023-01-13 RX ADMIN — Medication 1 MILLIGRAM(S): at 19:00

## 2023-01-13 RX ADMIN — SODIUM CHLORIDE 100 MILLILITER(S): 9 INJECTION INTRAMUSCULAR; INTRAVENOUS; SUBCUTANEOUS at 17:20

## 2023-01-13 RX ADMIN — Medication 50 MILLIGRAM(S): at 19:00

## 2023-01-13 RX ADMIN — SIMVASTATIN 20 MILLIGRAM(S): 20 TABLET, FILM COATED ORAL at 21:24

## 2023-01-13 RX ADMIN — Medication 81 MILLIGRAM(S): at 12:18

## 2023-01-13 RX ADMIN — SODIUM CHLORIDE 50 MILLILITER(S): 9 INJECTION INTRAMUSCULAR; INTRAVENOUS; SUBCUTANEOUS at 12:18

## 2023-01-13 RX ADMIN — CLOPIDOGREL BISULFATE 600 MILLIGRAM(S): 75 TABLET, FILM COATED ORAL at 12:18

## 2023-01-13 RX ADMIN — Medication 325 MILLIGRAM(S): at 19:00

## 2023-01-13 RX ADMIN — Medication 2: at 22:46

## 2023-01-13 NOTE — PATIENT PROFILE ADULT - FALL HARM RISK - HARM RISK INTERVENTIONS
Communicate Risk of Fall with Harm to all staff/Monitor gait and stability/Reinforce activity limits and safety measures with patient and family/Review medications for side effects contributing to fall risk/Tailored Fall Risk Interventions/Visual Cue: Yellow wristband and red socks/Bed in lowest position, wheels locked, appropriate side rails in place/Call bell, personal items and telephone in reach/Instruct patient to call for assistance before getting out of bed or chair/Non-slip footwear when patient is out of bed/[place] to call system/Physically safe environment - no spills, clutter or unnecessary equipment/Purposeful Proactive Rounding/Room/bathroom lighting operational, light cord in reach

## 2023-01-13 NOTE — PATIENT PROFILE ADULT - DO YOU FEEL LIKE HURTING YOURSELF OR OTHERS?
Patient settled into room L204 in stable condition from Labor and Delivery. Report received from FRED Lehman. Baby bands verified. Room orientation provided. Care Plan and Safety Education initiated.     Call light placed within patient reach and sign “call before you fall” posted on white board. Bed alarm activated upon transfer and confirmed with L&D nurse. Patient instructed to call RN when ready to ambulate.    no

## 2023-01-14 ENCOUNTER — TRANSCRIPTION ENCOUNTER (OUTPATIENT)
Age: 83
End: 2023-01-14

## 2023-01-14 VITALS — TEMPERATURE: 98 F

## 2023-01-14 LAB
ANION GAP SERPL CALC-SCNC: 15 MMOL/L — SIGNIFICANT CHANGE UP (ref 5–17)
BUN SERPL-MCNC: 15 MG/DL — SIGNIFICANT CHANGE UP (ref 7–23)
CALCIUM SERPL-MCNC: 8.6 MG/DL — SIGNIFICANT CHANGE UP (ref 8.4–10.5)
CHLORIDE SERPL-SCNC: 99 MMOL/L — SIGNIFICANT CHANGE UP (ref 96–108)
CO2 SERPL-SCNC: 19 MMOL/L — LOW (ref 22–31)
CREAT SERPL-MCNC: 0.72 MG/DL — SIGNIFICANT CHANGE UP (ref 0.5–1.3)
EGFR: 84 ML/MIN/1.73M2 — SIGNIFICANT CHANGE UP
GLUCOSE BLDC GLUCOMTR-MCNC: 121 MG/DL — HIGH (ref 70–99)
GLUCOSE SERPL-MCNC: 120 MG/DL — HIGH (ref 70–99)
HCT VFR BLD CALC: 38.4 % — SIGNIFICANT CHANGE UP (ref 34.5–45)
HGB BLD-MCNC: 12.4 G/DL — SIGNIFICANT CHANGE UP (ref 11.5–15.5)
MAGNESIUM SERPL-MCNC: 1.9 MG/DL — SIGNIFICANT CHANGE UP (ref 1.6–2.6)
MCHC RBC-ENTMCNC: 27.7 PG — SIGNIFICANT CHANGE UP (ref 27–34)
MCHC RBC-ENTMCNC: 32.3 GM/DL — SIGNIFICANT CHANGE UP (ref 32–36)
MCV RBC AUTO: 85.7 FL — SIGNIFICANT CHANGE UP (ref 80–100)
NRBC # BLD: 0 /100 WBCS — SIGNIFICANT CHANGE UP (ref 0–0)
PLATELET # BLD AUTO: 224 K/UL — SIGNIFICANT CHANGE UP (ref 150–400)
POTASSIUM SERPL-MCNC: 4.1 MMOL/L — SIGNIFICANT CHANGE UP (ref 3.5–5.3)
POTASSIUM SERPL-SCNC: 4.1 MMOL/L — SIGNIFICANT CHANGE UP (ref 3.5–5.3)
RBC # BLD: 4.48 M/UL — SIGNIFICANT CHANGE UP (ref 3.8–5.2)
RBC # FLD: 14.7 % — HIGH (ref 10.3–14.5)
SODIUM SERPL-SCNC: 133 MMOL/L — LOW (ref 135–145)
WBC # BLD: 9.68 K/UL — SIGNIFICANT CHANGE UP (ref 3.8–10.5)
WBC # FLD AUTO: 9.68 K/UL — SIGNIFICANT CHANGE UP (ref 3.8–10.5)

## 2023-01-14 PROCEDURE — 83036 HEMOGLOBIN GLYCOSYLATED A1C: CPT

## 2023-01-14 PROCEDURE — 83735 ASSAY OF MAGNESIUM: CPT

## 2023-01-14 PROCEDURE — 36415 COLL VENOUS BLD VENIPUNCTURE: CPT

## 2023-01-14 PROCEDURE — 93005 ELECTROCARDIOGRAM TRACING: CPT

## 2023-01-14 PROCEDURE — 82550 ASSAY OF CK (CPK): CPT

## 2023-01-14 PROCEDURE — 80061 LIPID PANEL: CPT

## 2023-01-14 PROCEDURE — 85027 COMPLETE CBC AUTOMATED: CPT

## 2023-01-14 PROCEDURE — C1753: CPT

## 2023-01-14 PROCEDURE — 80048 BASIC METABOLIC PNL TOTAL CA: CPT

## 2023-01-14 PROCEDURE — 82962 GLUCOSE BLOOD TEST: CPT

## 2023-01-14 PROCEDURE — C1894: CPT

## 2023-01-14 PROCEDURE — 85347 COAGULATION TIME ACTIVATED: CPT

## 2023-01-14 PROCEDURE — 90662 IIV NO PRSV INCREASED AG IM: CPT

## 2023-01-14 PROCEDURE — 85610 PROTHROMBIN TIME: CPT

## 2023-01-14 PROCEDURE — 85730 THROMBOPLASTIN TIME PARTIAL: CPT

## 2023-01-14 PROCEDURE — C1769: CPT

## 2023-01-14 PROCEDURE — C1874: CPT

## 2023-01-14 PROCEDURE — 80053 COMPREHEN METABOLIC PANEL: CPT

## 2023-01-14 PROCEDURE — 85025 COMPLETE CBC W/AUTO DIFF WBC: CPT

## 2023-01-14 PROCEDURE — 82553 CREATINE MB FRACTION: CPT

## 2023-01-14 PROCEDURE — C1724: CPT

## 2023-01-14 PROCEDURE — C1725: CPT

## 2023-01-14 PROCEDURE — C1887: CPT

## 2023-01-14 PROCEDURE — 99239 HOSP IP/OBS DSCHRG MGMT >30: CPT

## 2023-01-14 RX ORDER — SIMVASTATIN 20 MG/1
1 TABLET, FILM COATED ORAL
Qty: 0 | Refills: 0 | DISCHARGE

## 2023-01-14 RX ORDER — ATORVASTATIN CALCIUM 80 MG/1
1 TABLET, FILM COATED ORAL
Qty: 30 | Refills: 2
Start: 2023-01-14 | End: 2023-04-13

## 2023-01-14 RX ORDER — PANTOPRAZOLE SODIUM 20 MG/1
1 TABLET, DELAYED RELEASE ORAL
Qty: 30 | Refills: 11
Start: 2023-01-14 | End: 2024-01-08

## 2023-01-14 RX ORDER — CLOPIDOGREL BISULFATE 75 MG/1
1 TABLET, FILM COATED ORAL
Qty: 30 | Refills: 11
Start: 2023-01-14 | End: 2024-01-08

## 2023-01-14 RX ORDER — ASPIRIN/CALCIUM CARB/MAGNESIUM 324 MG
1 TABLET ORAL
Qty: 0 | Refills: 0 | DISCHARGE

## 2023-01-14 RX ORDER — ASPIRIN/CALCIUM CARB/MAGNESIUM 324 MG
1 TABLET ORAL
Qty: 30 | Refills: 11
Start: 2023-01-14 | End: 2024-01-08

## 2023-01-14 RX ORDER — METFORMIN HYDROCHLORIDE 850 MG/1
1 TABLET ORAL
Qty: 60 | Refills: 0
Start: 2023-01-14 | End: 2023-02-12

## 2023-01-14 RX ADMIN — Medication 50 MILLIGRAM(S): at 05:30

## 2023-01-14 RX ADMIN — Medication 81 MILLIGRAM(S): at 11:00

## 2023-01-14 RX ADMIN — INFLUENZA VIRUS VACCINE 0.7 MILLILITER(S): 15; 15; 15; 15 SUSPENSION INTRAMUSCULAR at 11:41

## 2023-01-14 RX ADMIN — Medication 1 MILLIGRAM(S): at 11:01

## 2023-01-14 RX ADMIN — Medication 88 MICROGRAM(S): at 06:56

## 2023-01-14 RX ADMIN — CLOPIDOGREL BISULFATE 75 MILLIGRAM(S): 75 TABLET, FILM COATED ORAL at 11:00

## 2023-01-14 RX ADMIN — Medication 325 MILLIGRAM(S): at 11:00

## 2023-01-14 NOTE — DISCHARGE NOTE PROVIDER - CARE PROVIDER_API CALL
Berenice Montero (MD)  Cardiovascular Disease; Internal Medicine  [address] [place] [address]  Phone: [phone]  Fax: [phone]  Follow Up Time:

## 2023-01-14 NOTE — DISCHARGE NOTE NURSING/CASE MANAGEMENT/SOCIAL WORK - NSDCVIVACCINE_GEN_ALL_CORE_FT
influenza, high-dose, quadrivalent; 14-Jan-2023 11:41; Mendoza Mcrae (MATTHEW); [employer]; [id number] (Exp. Date: 30-Jun-2023); IntraMuscular; Deltoid Left.; 0.7 milliLiter(s); VIS (VIS Published: 06-Aug-2021, VIS Presented: 14-Jan-2023);

## 2023-01-14 NOTE — DISCHARGE NOTE NURSING/CASE MANAGEMENT/SOCIAL WORK - NSDCPEFALRISK_GEN_ALL_CORE
For information on Fall & Injury Prevention, visit: [email or web address] OR  [email or web address] OR  https://www.cdc.gov/steadi/patient.html

## 2023-01-14 NOTE — DISCHARGE NOTE NURSING/CASE MANAGEMENT/SOCIAL WORK - PATIENT PORTAL LINK FT
You can access the FollowMyHealth Patient Portal offered by [facility] by registering at the following website: [email or web address]. By joining [employer]’s FollowMyHealth portal, you will also be able to view your health information using other applications (apps) compatible with our system.

## 2023-01-14 NOTE — DISCHARGE NOTE PROVIDER - NSDCMRMEDTOKEN_GEN_ALL_CORE_FT
aspirin 81 mg oral tablet: 1 tab(s) orally  Aspirin Enteric Coated 81 mg oral delayed release tablet: 1 tab(s) orally once a day  Cardiac Rehabiliation : 3x / week for 12wks for diagnosis of Coronary Artery Disease (cardiologist: Dr. Berenice Montero)  clopidogrel 75 mg oral tablet: 1 tab(s) orally once a day  Feosol 325 mg (65 mg elemental iron) oral tablet: 1 tab(s) orally every other day  folic acid 0.8 mg oral tablet: 1 tab(s) orally once a day  levothyroxine 75 mcg (0.075 mg) oral tablet: 1 tab(s) orally Monday through Thursday    levothyroxine 88 mcg (0.088 mg) oral capsule: 1 cap(s) orally Friday, Saturday, Sunday  Lopressor 50 mg oral tablet: 1 tab(s) orally every 12 hours  metFORMIN 500 mg oral tablet: 1 tab(s) orally 2 times a day; RESTART ON MONDAY 1/16/23.  pantoprazole 40 mg oral delayed release tablet: 1 tab(s) orally once a day   potassium chloride 10 mEq oral tablet, extended release: 1 tab(s) orally 2 times a day  simvastatin 20 mg oral tablet: 1 tab(s) orally once a day (at bedtime)   Aspirin Enteric Coated 81 mg oral delayed release tablet: 1 tab(s) orally once a day  atorvastatin 40 mg oral tablet: 1 tab(s) orally once a day   clopidogrel 75 mg oral tablet: 1 tab(s) orally once a day  Feosol 325 mg (65 mg elemental iron) oral tablet: 1 tab(s) orally every other day  folic acid 0.8 mg oral tablet: 1 tab(s) orally once a day  levothyroxine 75 mcg (0.075 mg) oral tablet: 1 tab(s) orally Monday through Thursday    levothyroxine 88 mcg (0.088 mg) oral capsule: 1 cap(s) orally Friday, Saturday, Sunday  Lopressor 50 mg oral tablet: 1 tab(s) orally every 12 hours  metFORMIN 500 mg oral tablet: 1 tab(s) orally 2 times a day; RESTART ON MONDAY 1/16/23.  pantoprazole 40 mg oral delayed release tablet: 1 tab(s) orally once a day   potassium chloride 10 mEq oral tablet, extended release: 1 tab(s) orally 2 times a day

## 2023-01-14 NOTE — DISCHARGE NOTE PROVIDER - HOSPITAL COURSE
81 yo F with PMHx of HTN, HLD, DM-II, Thyroid CA, AS (s/p bioprosthetic AVR in 2016), chronic diastolic CHF who presented to cardiologist Dr. Montero with reports of CLARKE with ambulating after 1/2 miles to a miles for the past 4 months. Pt denies fever, chills, cough, CP, palpitations, LE edema, abdominal pain, N/V/D, dizziness, syncope. Pt underwent CCTA 12/6/22: calcium score 3018, dense calcium in LAD, RCA, RPDA obscures lumen, unable to r/o significant stenosis. CT FFR: RCA 0.79 (+), LAD 0.94 (-), LCx 0.84 (-), RPDA 0.71 (+). B/L tree in bud nodules compatible with small airway disease, bronchiolar wall thickening, random B/L pulmonary nodules measuring up to 6 mm. ECHO 11/18/22: EF 57%, bioprosthetic valve, peak AV gradient 13mmHg, mean gradient 8mmHg, mild MR/TR, trace FL. Cardiac cath 9/29/2016: mLAD 30%, dLAD 50%, EF 60%, severe AS. In light of pt's risk factors, CCS anginal equivalent symptoms, and abnormal CCTA result, pt is now referred to the cardiac cath with possible intervention if clinical indicated.     Pt now s/p cardiac catheterization (1/14/23): rota/JULIET x2 p/mLAD; no EDP. ACCESS: R radial w/ TR band for hemostasis.     Pt evaluated prior to discharge. VSS, labs unremarkable, telemetry noted for 12beats NSVT overnigth (asymptomatic, VSS, no recurrence), and physical exam benign w/ right radial access site stable without hematoma/bleeding. Hospital course reviewed with attending cardiologist and patient deemed stable for discharge home from the hospital. Pt to follow up with cardiologist, Dr. Montero, within 1-2 weeks of discharge. DAPT: ASA/Plavix. STATIN: ______ (changed from home Simvastatin 20mg PO QHS). Cardiac rehabilitation prescription provided to patient. Prescriptions sent to patient's preferred pharmacy and discharge instructions reviewed with patient. 83 yo F with PMHx of HTN, HLD, DM-II, Thyroid CA, AS (s/p bioprosthetic AVR in 2016), chronic diastolic CHF who presented to cardiologist Dr. Montero with reports of CLARKE with ambulating after 1/2 miles to a miles for the past 4 months. Pt denies fever, chills, cough, CP, palpitations, LE edema, abdominal pain, N/V/D, dizziness, syncope. Pt underwent CCTA 12/6/22: calcium score 3018, dense calcium in LAD, RCA, RPDA obscures lumen, unable to r/o significant stenosis. CT FFR: RCA 0.79 (+), LAD 0.94 (-), LCx 0.84 (-), RPDA 0.71 (+). B/L tree in bud nodules compatible with small airway disease, bronchiolar wall thickening, random B/L pulmonary nodules measuring up to 6 mm. ECHO 11/18/22: EF 57%, bioprosthetic valve, peak AV gradient 13mmHg, mean gradient 8mmHg, mild MR/TR, trace WA. Cardiac cath 9/29/2016: mLAD 30%, dLAD 50%, EF 60%, severe AS. In light of pt's risk factors, CCS anginal equivalent symptoms, and abnormal CCTA result, pt is now referred to the cardiac cath with possible intervention if clinical indicated.     Pt now s/p cardiac catheterization (1/14/23): rota/JULIET x2 p/mLAD; no EDP. ACCESS: R radial w/ TR band for hemostasis.     Pt evaluated prior to discharge. VSS, labs unremarkable, telemetry noted for 12beats NSVT overnigth (asymptomatic, VSS, no recurrence), and physical exam benign w/ right radial access site stable without hematoma/bleeding. Hospital course reviewed with attending cardiologist and patient deemed stable for discharge home from the hospital. Pt to follow up with cardiologist, Dr. Montero, within 1-2 weeks of discharge. DAPT: ASA/Plavix. STATIN: Atorvastatin 40mg PO QHS (changed from home Simvastatin 20mg PO QHS). Cardiac rehabilitation prescription provided to patient. Prescriptions sent to patient's preferred pharmacy and discharge instructions reviewed with patient.

## 2023-01-14 NOTE — DISCHARGE NOTE PROVIDER - NSDCCPCAREPLAN_GEN_ALL_CORE_FT
PRINCIPAL DISCHARGE DIAGNOSIS  Diagnosis: CAD (coronary artery disease)  Assessment and Plan of Treatment: You underwent a cardiac angiogram and received a two (2) stents to the [id number] left anterior descending artery (p/mLAD). PLEASE CONTINUE ASPIRIN 81MG DAILY AND PLAVIX 75MG DAILY. DO NOT STOP THESE MEDICATIONS FOR ANY REASON AS THEY ARE KEEPING YOUR STENT OPEN AND PREVENTING A HEART ATTACK. Avoid strenuous activity or heavy lifting for the next five days. Do not take a bath or swim for the next five days; you may shower. For any bleeding or hematoma formation (hardened blood collection under the skin) at the access site of RIGHT WRIST please hold pressure and go to the emergency room. Please follow up with Dr. SHIELDS in 1-2 weeks. For recurrent chest pain, please call your doctor or go to the emergency room.      SECONDARY DISCHARGE DIAGNOSES  Diagnosis: Encounter for cardiac rehabilitation  Assessment and Plan of Treatment: We have provided you with a prescription for cardiac rehab which is medically supervised exercise program for your heart and has been shown to improve the quantity and quality of life of people with heart disease like yours. You should attend cardiac rehab 3 times per week for 12 weeks. We have provided you with a list of nearby facilities. Please call your insurance carrier to determine which of these facilities are covered under your plan. Please bring this prescription with you to your follow up appointment with your cardiologist who can then further assist you to enroll into a cardiac rehab program.    Diagnosis: HTN (hypertension)  Assessment and Plan of Treatment: Please continue medications as listed to keep your blood pressure controlled. For blood pressure that is too high or too low please see your doctor or go to the emergency room as necessary.    Diagnosis: HLD (hyperlipidemia)  Assessment and Plan of Treatment: Please continue ____ to keep your cholesterol low. High cholesterol contributes to heart disease.    Diagnosis: DM (diabetes mellitus)  Assessment and Plan of Treatment: Please continue medications as listed for diabetes. Maintain a low carbohydrate, low sugar diet. Check for your blood sugars regularly.   RESTART METFORMIN ON MONDAY 1/16/23.     PRINCIPAL DISCHARGE DIAGNOSIS  Diagnosis: CAD (coronary artery disease)  Assessment and Plan of Treatment: You underwent a cardiac angiogram and received a two (2) stents to the [id number]-[id number]-xml left anterior descending artery (p/mLAD). PLEASE CONTINUE ASPIRIN 81MG DAILY AND PLAVIX 75MG DAILY. DO NOT STOP THESE MEDICATIONS FOR ANY REASON AS THEY ARE KEEPING YOUR STENT OPEN AND PREVENTING A HEART ATTACK. Avoid strenuous activity or heavy lifting for the next five days. Do not take a bath or swim for the next five days; you may shower. For any bleeding or hematoma formation (hardened blood collection under the skin) at the access site of RIGHT WRIST please hold pressure and go to the emergency room. Please follow up with Dr. SHIELDS in 1-2 weeks. For recurrent chest pain, please call your doctor or go to the emergency room.      SECONDARY DISCHARGE DIAGNOSES  Diagnosis: Encounter for cardiac rehabilitation  Assessment and Plan of Treatment: We have provided you with a prescription for cardiac rehab which is medically supervised exercise program for your heart and has been shown to improve the quantity and quality of life of people with heart disease like yours. You should attend cardiac rehab 3 times per week for 12 weeks. We have provided you with a list of nearby facilities. Please call your insurance carrier to determine which of these facilities are covered under your plan. Please bring this prescription with you to your follow up appointment with your cardiologist who can then further assist you to enroll into a cardiac rehab program.    Diagnosis: HTN (hypertension)  Assessment and Plan of Treatment: Please continue medications as listed to keep your blood pressure controlled. For blood pressure that is too high or too low please see your doctor or go to the emergency room as necessary.    Diagnosis: HLD (hyperlipidemia)  Assessment and Plan of Treatment: Please continue ATORVASTATIN 40MG ONCE DAILY to keep your cholesterol low. High cholesterol contributes to heart disease.    Diagnosis: DM (diabetes mellitus)  Assessment and Plan of Treatment: Please continue medications as listed for diabetes. Maintain a low carbohydrate, low sugar diet. Check for your blood sugars regularly.   RESTART METFORMIN ON MONDAY 1/16/23.

## 2023-01-19 DIAGNOSIS — E89.0 POSTPROCEDURAL HYPOTHYROIDISM: ICD-10-CM

## 2023-01-19 DIAGNOSIS — I25.10 ATHEROSCLEROTIC HEART DISEASE OF NATIVE CORONARY ARTERY WITHOUT ANGINA PECTORIS: ICD-10-CM

## 2023-01-19 DIAGNOSIS — Z85.850 PERSONAL HISTORY OF MALIGNANT NEOPLASM OF THYROID: ICD-10-CM

## 2023-01-19 DIAGNOSIS — I25.84 CORONARY ATHEROSCLEROSIS DUE TO CALCIFIED CORONARY LESION: ICD-10-CM

## 2023-01-19 DIAGNOSIS — I34.0 NONRHEUMATIC MITRAL (VALVE) INSUFFICIENCY: ICD-10-CM

## 2023-01-19 DIAGNOSIS — Z88.0 ALLERGY STATUS TO PENICILLIN: ICD-10-CM

## 2023-01-19 DIAGNOSIS — Z95.3 PRESENCE OF XENOGENIC HEART VALVE: ICD-10-CM

## 2023-01-19 DIAGNOSIS — I11.0 HYPERTENSIVE HEART DISEASE WITH HEART FAILURE: ICD-10-CM

## 2023-01-19 DIAGNOSIS — I50.32 CHRONIC DIASTOLIC (CONGESTIVE) HEART FAILURE: ICD-10-CM

## 2023-01-19 DIAGNOSIS — I36.1 NONRHEUMATIC TRICUSPID (VALVE) INSUFFICIENCY: ICD-10-CM

## 2023-01-19 DIAGNOSIS — Z79.82 LONG TERM (CURRENT) USE OF ASPIRIN: ICD-10-CM

## 2023-01-19 DIAGNOSIS — E11.9 TYPE 2 DIABETES MELLITUS WITHOUT COMPLICATIONS: ICD-10-CM

## 2023-01-19 DIAGNOSIS — Z79.84 LONG TERM (CURRENT) USE OF ORAL HYPOGLYCEMIC DRUGS: ICD-10-CM

## 2023-01-19 DIAGNOSIS — E78.5 HYPERLIPIDEMIA, UNSPECIFIED: ICD-10-CM

## 2023-01-19 DIAGNOSIS — Z90.710 ACQUIRED ABSENCE OF BOTH CERVIX AND UTERUS: ICD-10-CM

## 2023-01-24 ENCOUNTER — NON-APPOINTMENT (OUTPATIENT)
Age: 83
End: 2023-01-24

## 2023-01-24 ENCOUNTER — APPOINTMENT (OUTPATIENT)
Dept: HEART AND VASCULAR | Facility: CLINIC | Age: 83
End: 2023-01-24
Payer: MEDICARE

## 2023-01-24 VITALS
OXYGEN SATURATION: 98 % | WEIGHT: 175 LBS | TEMPERATURE: 98.7 F | DIASTOLIC BLOOD PRESSURE: 70 MMHG | BODY MASS INDEX: 31.01 KG/M2 | SYSTOLIC BLOOD PRESSURE: 135 MMHG | HEIGHT: 63 IN | HEART RATE: 77 BPM

## 2023-01-24 DIAGNOSIS — E78.5 HYPERLIPIDEMIA, UNSPECIFIED: ICD-10-CM

## 2023-01-24 PROCEDURE — 93000 ELECTROCARDIOGRAM COMPLETE: CPT

## 2023-01-24 PROCEDURE — 99214 OFFICE O/P EST MOD 30 MIN: CPT | Mod: 25

## 2023-01-25 NOTE — CARDIOLOGY SUMMARY
[de-identified] : Conclusions:\par One vessel CAD\par LM: NormalLAD: 80% prox, 99% (subtotal) mid stenosis, heavily calcified vessel\par LCx: Mild luminal irregularities\par RCA: 30% prox stenosis, dominant vessel\par \par Interventions\par - Successful intervention of pLAD 80% calcified stenosis with rotational atherectomy, a scoring ballon, and a SYNERGY [id number] JULIET; which was post dilated with a 3.5mm NC balloon. 0% Residual stenosis post intervention with excellentangiographic result and SHILOH 3 flow. IVUS(Post): MSA > 7.0 mm2, Excellent stent apposition and expansion without distal edge dissection.\par - Successful intervention of mLAD 99% calcified stenosis with rotational atherectomy, a scoring ballon, and a SYNERGY [id number] JULIET; which was post dilated with a 3.0mm NC balloon. 0% Residual stenosis post intervention with excellentangiographic result and SHILOH 3 flow

## 2023-01-25 NOTE — HISTORY OF PRESENT ILLNESS
[FreeTextEntry1] : 81 y/o F with PMHx of CAD s/p Rota/PCI of LAD 1/2023, AVR (Bioprosthetic 2016), CAD, HTN, HLD, DM2\par \par Cath 9/29/2016: 30% mid LAD, 50% distal LAD, EF 60%, severe AS\par Cath 1/13/2023: 80% prox, 99% mid LAD heavily Ca, 30% prox RCA, mild LCx. s/p Rota/PCI of LAD, DESx2\par \par CCTA 12/6/2022: Ca score 3018, dense calcium in LAD, RCA RPDA obscures lumen, unable to rule out significant stenosis\par CT FFR: RCA 0.79 and LAD 0.77 +ve, LCx 0.84\par \par TTE 11/18/2022: EF normal 57%, Bioprosthetic valve mean grd 8mmHg, mild MR, mild TR\par \par EKG 10/25/2022: NSR, HR 71, IVCD\par EKG 1/24/2023: NSR, HR 72, IVCD, unchanged from prior\par \par Patient reports doing well post PCI\par Denies chest pain, palpitations, PND/Orthopnea\par No vascular access site issues reported today, had visit to [facility] for ecchymosis which concerned patient however was discharged, no intervention needed. Ecchymosis has resolved

## 2023-01-25 NOTE — DISCUSSION/SUMMARY
[FreeTextEntry1] : 83 y/o F with PMHx of CAD s/p Rota/PCI of LAD 1/2023, AVR (Bioprosthetic 2016), CAD, HTN, HLD, DM2\par \par # CAD\par s/p Complex Rota/PCI of LAD 1/13/2023\par Continue ASA, Plavix\par Optimize medical therapy and risk factors\par Continue Statin, BB \par \par # AVR\par TTE 11/18/2022 performed at [facility] shows normal function aortic valve\par \par # HLD\par Continue Lipitor 40mg daily

## 2023-02-21 RX ORDER — METOPROLOL SUCCINATE 25 MG/1
25 TABLET, EXTENDED RELEASE ORAL DAILY
Qty: 90 | Refills: 3 | Status: ACTIVE | COMMUNITY
Start: 2023-02-21 | End: 1900-01-01

## 2023-02-28 ENCOUNTER — NON-APPOINTMENT (OUTPATIENT)
Age: 83
End: 2023-02-28

## 2023-03-28 ENCOUNTER — APPOINTMENT (OUTPATIENT)
Dept: HEART AND VASCULAR | Facility: CLINIC | Age: 83
End: 2023-03-28

## 2023-03-31 ENCOUNTER — APPOINTMENT (OUTPATIENT)
Dept: CT IMAGING | Facility: HOSPITAL | Age: 83
End: 2023-03-31

## 2023-03-31 ENCOUNTER — OUTPATIENT (OUTPATIENT)
Dept: OUTPATIENT SERVICES | Facility: HOSPITAL | Age: 83
LOS: 1 days | End: 2023-03-31
Payer: MEDICARE

## 2023-03-31 DIAGNOSIS — Z95.2 PRESENCE OF PROSTHETIC HEART VALVE: Chronic | ICD-10-CM

## 2023-03-31 DIAGNOSIS — E89.0 POSTPROCEDURAL HYPOTHYROIDISM: Chronic | ICD-10-CM

## 2023-03-31 DIAGNOSIS — Z98.89 OTHER SPECIFIED POSTPROCEDURAL STATES: Chronic | ICD-10-CM

## 2023-03-31 DIAGNOSIS — Z90.710 ACQUIRED ABSENCE OF BOTH CERVIX AND UTERUS: Chronic | ICD-10-CM

## 2023-03-31 PROCEDURE — 71250 CT THORAX DX C-: CPT | Mod: 26

## 2023-03-31 PROCEDURE — 71250 CT THORAX DX C-: CPT

## 2023-04-04 RX ORDER — PANTOPRAZOLE 40 MG/1
40 TABLET, DELAYED RELEASE ORAL DAILY
Qty: 90 | Refills: 3 | Status: ACTIVE | COMMUNITY
Start: 2023-01-24 | End: 1900-01-01

## 2023-04-04 RX ORDER — ATORVASTATIN CALCIUM 40 MG/1
40 TABLET, FILM COATED ORAL
Qty: 90 | Refills: 3 | Status: ACTIVE | COMMUNITY
Start: 2023-01-24 | End: 1900-01-01

## 2023-04-04 RX ORDER — CLOPIDOGREL BISULFATE 75 MG/1
75 TABLET, FILM COATED ORAL
Qty: 90 | Refills: 3 | Status: ACTIVE | COMMUNITY
Start: 2023-01-24 | End: 1900-01-01

## 2023-04-06 ENCOUNTER — APPOINTMENT (OUTPATIENT)
Dept: THORACIC SURGERY | Facility: CLINIC | Age: 83
End: 2023-04-06
Payer: MEDICARE

## 2023-04-06 PROCEDURE — 99212 OFFICE O/P EST SF 10 MIN: CPT | Mod: 95

## 2023-04-06 NOTE — HISTORY OF PRESENT ILLNESS
[FreeTextEntry1] : 84 y/o female, nonsmoker with a PMH of thyroid cancer (s/p total thyroidectomy in 2009, HTN, DMII, HLD, anxiety, aortic valve replacement (2016). She was referred by Dr. Vikas Álvarez for further evaluation. Prior PET scan In July 2022 showed a right middle lobe nodule (infectious/inflammatory changes versus malignancy). Completed Levaquin course x 7 days. She presents today with a CT chest. \par \par Patient presents today to review recent chest CT and discuss results. \par CT chest without contrast 10/24/22:\par 1. Poststernotomy and aortic valve replacement with heavy coronary artery calcification versus stents.\par 2. Findings compatible with small large airways inflammation characterized by bronchial wall thickening, mild cylindrical bronchiectasis and tree-in-bud micronodules.\par 3. Additional more discrete solid nodules some of which have a oval tubular appearance which may represent areas of mucous plugging unchanged from examination dated 7/12/2022 allowing for differences in imaging technique. The largest cyst located within the right middle lobe essentially unchanged measuring 6 mm. Abbreviated differential includes pulmonary nontuberculous mycobacterial infection.\par 4. Bronchoscopic correlation and continued short interval surveillance after therapeutic administration is suggested minimally to include 3 months follow-up in view the provided history of extrathoracic malignancy.\par \par CT chest completed on 03/31/23:\par -Since November 4, 2022, unchanged solid subcentimeter lung nodules and unchanged small and large airway disease.

## 2023-04-06 NOTE — ASSESSMENT
[FreeTextEntry1] : 84 y/o female, nonsmoker with a PMH of thyroid cancer (s/p total thyroidectomy in 2009, HTN, DMII, HLD, anxiety, aortic valve replacement (2016). She was referred by Dr. Vikas Álvarez for further evaluation. Prior PET scan In July 2022 showed a right middle lobe nodule (infectious/inflammatory changes versus malignancy). Completed Levaquin course x 7 days. She presents today with a CT chest. \par \par Patient presents today to review recent chest CT and discuss results. \par CT chest without contrast 10/24/22:\par 1. Poststernotomy and aortic valve replacement with heavy coronary artery calcification versus stents.\par 2. Findings compatible with small large airways inflammation characterized by bronchial wall thickening, mild cylindrical bronchiectasis and tree-in-bud micronodules.\par 3. Additional more discrete solid nodules some of which have a oval tubular appearance which may represent areas of mucous plugging unchanged from examination dated 7/12/2022 allowing for differences in imaging technique. The largest cyst located within the right middle lobe essentially unchanged measuring 6 mm. Abbreviated differential includes pulmonary nontuberculous mycobacterial infection.\par 4. Bronchoscopic correlation and continued short interval surveillance after therapeutic administration is suggested minimally to include 3 months follow-up in view the provided history of extrathoracic malignancy.\par \par CT chest completed on 03/31/23:\par -Since November 4, 2022, unchanged solid subcentimeter lung nodules and unchanged small and large airway disease. \par \par She is doing well. Her chest CT was reviewed with the patient and shows unchanged nodules. We will plan for a followup chest CT in 6 months.\par \par Plan:\par Chest CT in 6 months\par \par Tracey OLEA MD personally performed the services described in the documentation, reviewed the documentation recorded by the scribe in my presence and it accurately and completely records my words and actions. I have personally seen, examined, and participated in the care of this patient.  I have reviewed all pertinent clinical information, including history and physical exam and plan.  I agree with the above history, physical and plan of the ACP which I have reviewed and edited where appropriate.\par \par Total time of 15 minutes with > 50% spent with the patient discussing radiologic studies, diagnosis, treatment options, and risks and benefits of surgery.\par \par \par \par

## 2023-10-21 ENCOUNTER — OUTPATIENT (OUTPATIENT)
Dept: OUTPATIENT SERVICES | Facility: HOSPITAL | Age: 83
LOS: 1 days | End: 2023-10-21
Payer: MEDICARE

## 2023-10-21 ENCOUNTER — APPOINTMENT (OUTPATIENT)
Dept: CT IMAGING | Facility: HOSPITAL | Age: 83
End: 2023-10-21
Payer: MEDICARE

## 2023-10-21 DIAGNOSIS — Z90.710 ACQUIRED ABSENCE OF BOTH CERVIX AND UTERUS: Chronic | ICD-10-CM

## 2023-10-21 DIAGNOSIS — E89.0 POSTPROCEDURAL HYPOTHYROIDISM: Chronic | ICD-10-CM

## 2023-10-21 DIAGNOSIS — Z95.2 PRESENCE OF PROSTHETIC HEART VALVE: Chronic | ICD-10-CM

## 2023-10-21 DIAGNOSIS — Z98.89 OTHER SPECIFIED POSTPROCEDURAL STATES: Chronic | ICD-10-CM

## 2023-10-21 PROCEDURE — 71250 CT THORAX DX C-: CPT | Mod: 26

## 2023-10-21 PROCEDURE — 71250 CT THORAX DX C-: CPT

## 2023-10-26 ENCOUNTER — APPOINTMENT (OUTPATIENT)
Dept: THORACIC SURGERY | Facility: CLINIC | Age: 83
End: 2023-10-26
Payer: MEDICARE

## 2023-10-26 PROCEDURE — 99442: CPT

## 2023-10-30 ENCOUNTER — APPOINTMENT (OUTPATIENT)
Dept: HEART AND VASCULAR | Facility: CLINIC | Age: 83
End: 2023-10-30
Payer: MEDICARE

## 2023-10-30 ENCOUNTER — APPOINTMENT (OUTPATIENT)
Dept: HEART AND VASCULAR | Facility: CLINIC | Age: 83
End: 2023-10-30

## 2023-10-30 PROCEDURE — 99214 OFFICE O/P EST MOD 30 MIN: CPT

## 2024-10-15 ENCOUNTER — APPOINTMENT (OUTPATIENT)
Dept: HEART AND VASCULAR | Facility: CLINIC | Age: 84
End: 2024-10-15
Payer: MEDICARE

## 2024-10-15 ENCOUNTER — NON-APPOINTMENT (OUTPATIENT)
Age: 84
End: 2024-10-15

## 2024-10-15 VITALS
HEART RATE: 82 BPM | TEMPERATURE: 98.1 F | BODY MASS INDEX: 30.12 KG/M2 | WEIGHT: 170 LBS | OXYGEN SATURATION: 96 % | HEIGHT: 63 IN | SYSTOLIC BLOOD PRESSURE: 128 MMHG | DIASTOLIC BLOOD PRESSURE: 64 MMHG

## 2024-10-15 PROCEDURE — 99214 OFFICE O/P EST MOD 30 MIN: CPT | Mod: 25

## 2024-10-15 PROCEDURE — 93000 ELECTROCARDIOGRAM COMPLETE: CPT

## 2024-10-15 RX ORDER — METFORMIN HYDROCHLORIDE 500 MG/1
500 TABLET, COATED ORAL
Refills: 0 | Status: ACTIVE | COMMUNITY

## 2025-03-17 ENCOUNTER — NON-APPOINTMENT (OUTPATIENT)
Age: 85
End: 2025-03-17

## 2025-03-22 ENCOUNTER — OUTPATIENT (OUTPATIENT)
Dept: OUTPATIENT SERVICES | Facility: HOSPITAL | Age: 85
LOS: 1 days | End: 2025-03-22
Payer: MEDICARE

## 2025-03-22 ENCOUNTER — APPOINTMENT (OUTPATIENT)
Dept: CT IMAGING | Facility: HOSPITAL | Age: 85
End: 2025-03-22

## 2025-03-22 DIAGNOSIS — Z95.2 PRESENCE OF PROSTHETIC HEART VALVE: Chronic | ICD-10-CM

## 2025-03-22 DIAGNOSIS — Z90.710 ACQUIRED ABSENCE OF BOTH CERVIX AND UTERUS: Chronic | ICD-10-CM

## 2025-03-22 DIAGNOSIS — E89.0 POSTPROCEDURAL HYPOTHYROIDISM: Chronic | ICD-10-CM

## 2025-03-22 DIAGNOSIS — Z98.89 OTHER SPECIFIED POSTPROCEDURAL STATES: Chronic | ICD-10-CM

## 2025-03-22 PROCEDURE — 71250 CT THORAX DX C-: CPT

## 2025-03-22 PROCEDURE — 71250 CT THORAX DX C-: CPT | Mod: 26

## 2025-04-03 ENCOUNTER — APPOINTMENT (OUTPATIENT)
Dept: THORACIC SURGERY | Facility: CLINIC | Age: 85
End: 2025-04-03
Payer: MEDICARE

## 2025-04-03 PROCEDURE — 99212 OFFICE O/P EST SF 10 MIN: CPT | Mod: 2W

## 2025-06-03 ENCOUNTER — APPOINTMENT (OUTPATIENT)
Dept: HEART AND VASCULAR | Facility: CLINIC | Age: 85
End: 2025-06-03
Payer: MEDICARE

## 2025-06-03 VITALS
BODY MASS INDEX: 27.29 KG/M2 | TEMPERATURE: 98.1 F | HEART RATE: 83 BPM | SYSTOLIC BLOOD PRESSURE: 170 MMHG | OXYGEN SATURATION: 98 % | HEIGHT: 63 IN | DIASTOLIC BLOOD PRESSURE: 72 MMHG | WEIGHT: 154 LBS

## 2025-06-03 PROCEDURE — 93000 ELECTROCARDIOGRAM COMPLETE: CPT

## 2025-06-03 PROCEDURE — 99214 OFFICE O/P EST MOD 30 MIN: CPT | Mod: 25
